# Patient Record
Sex: FEMALE | Race: BLACK OR AFRICAN AMERICAN | NOT HISPANIC OR LATINO | URBAN - METROPOLITAN AREA
[De-identification: names, ages, dates, MRNs, and addresses within clinical notes are randomized per-mention and may not be internally consistent; named-entity substitution may affect disease eponyms.]

---

## 2021-12-18 ENCOUNTER — EMERGENCY (EMERGENCY)
Facility: HOSPITAL | Age: 66
LOS: 0 days | Discharge: HOME | End: 2021-12-18
Attending: STUDENT IN AN ORGANIZED HEALTH CARE EDUCATION/TRAINING PROGRAM
Payer: MEDICARE

## 2021-12-18 VITALS
HEART RATE: 96 BPM | DIASTOLIC BLOOD PRESSURE: 80 MMHG | RESPIRATION RATE: 16 BRPM | SYSTOLIC BLOOD PRESSURE: 158 MMHG | TEMPERATURE: 98 F | OXYGEN SATURATION: 98 % | WEIGHT: 139.99 LBS | HEIGHT: 62 IN

## 2021-12-18 VITALS
SYSTOLIC BLOOD PRESSURE: 121 MMHG | OXYGEN SATURATION: 99 % | RESPIRATION RATE: 19 BRPM | DIASTOLIC BLOOD PRESSURE: 80 MMHG | HEART RATE: 80 BPM | TEMPERATURE: 97 F
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
## 2021-12-18 DIAGNOSIS — R20.0 ANESTHESIA OF SKIN: ICD-10-CM
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
## 2021-12-18 DIAGNOSIS — M54.9 DORSALGIA, UNSPECIFIED: ICD-10-CM

## 2021-12-18 DIAGNOSIS — Z88.0 ALLERGY STATUS TO PENICILLIN: ICD-10-CM

## 2021-12-18 DIAGNOSIS — R20.2 PARESTHESIA OF SKIN: ICD-10-CM

## 2021-12-18 DIAGNOSIS — Z88.6 ALLERGY STATUS TO ANALGESIC AGENT: ICD-10-CM

## 2021-12-18 LAB — GLUCOSE BLDC GLUCOMTR-MCNC: 110 MG/DL — HIGH (ref 70–99)

## 2021-12-18 PROCEDURE — 72125 CT NECK SPINE W/O DYE: CPT | Mod: 26,MA

## 2021-12-18 PROCEDURE — 99284 EMERGENCY DEPT VISIT MOD MDM: CPT

## 2021-12-18 PROCEDURE — 70450 CT HEAD/BRAIN W/O DYE: CPT | Mod: 26,MA

## 2021-12-18 RX ORDER — METHOCARBAMOL 500 MG/1
500 TABLET, FILM COATED ORAL ONCE
Refills: 0 | Status: COMPLETED | OUTPATIENT
Start: 2021-12-18 | End: 2021-12-18

## 2021-12-18 RX ORDER — OXYCODONE AND ACETAMINOPHEN 5; 325 MG/1; MG/1
1 TABLET ORAL ONCE
Refills: 0 | Status: DISCONTINUED | OUTPATIENT
Start: 2021-12-18 | End: 2021-12-18

## 2021-12-18 RX ADMIN — METHOCARBAMOL 500 MILLIGRAM(S): 500 TABLET, FILM COATED ORAL at 20:07

## 2021-12-18 RX ADMIN — OXYCODONE AND ACETAMINOPHEN 1 TABLET(S): 5; 325 TABLET ORAL at 20:07

## 2021-12-18 RX ADMIN — OXYCODONE AND ACETAMINOPHEN 1 TABLET(S): 5; 325 TABLET ORAL at 20:37

## 2021-12-18 NOTE — ED PROVIDER NOTE - OBJECTIVE STATEMENT
She is a 65 yr. old female with PMH of HTN, Asthma, CHD, Bipolar disorder, Hypothyroidism, cervical fusion BIBEMS c/o fall. She was found outside [facility]. She has chronic back pain and was seen by pain management on Thursday. She was sent to [facility] and was sent home after pain medication. Today she went to [facility] again c/o severe pain, but they were not willing to treat her with oxycodone, flexeril, Cymbalta and gabapentin together (she stated oxycodone alone will not relieve her pain).  So she walked out, but her leg gave out and fell. She reports numbness and tingling on her legs and feet, urinary retention at times, and bowel incontinence. She states she is here for pain management. Denies LOC, headache,nausea, vomiting, fever, chills, injury to head or any body parts. She is a 65 yr. old female with PMH of HTN, Asthma, CHD, Bipolar disorder, Hypothyroidism, cervical fusion, s/p cervical spine surgery (Jun 2020) BIBEMS c/o fall. She was found outside [facility]. She has chronic back pain and was seen by pain management on Thursday. She was sent to [facility] and was sent home after pain medication. Today she went to [facility] again c/o severe back pain, but they were not willing to treat her with oxycodone, flexeril, Cymbalta and gabapentin together (she stated oxycodone alone will not relieve her pain).  So she walked out, but her leg gave out and fell. She reports numbness and tingling on her legs and feet, urinary retention at times, and bowel incontinence. She states she is here for pain management. Denies LOC, headache, nausea, vomiting, fever, chills, injury to head or any body parts.

## 2021-12-18 NOTE — ED PROVIDER NOTE - NSFOLLOWUPINSTRUCTIONS_ED_ALL_ED_FT
1) Follow up with your primary care doctor  2) Return to the ER for worsening or concerning symptoms

## 2021-12-18 NOTE — ED ADULT NURSE NOTE - NSIMPLEMENTINTERV_GEN_ALL_ED
Implemented All Fall with Harm Risk Interventions:  [place] to call system. Call bell, personal items and telephone within reach. Instruct patient to call for assistance. Room bathroom lighting operational. Non-slip footwear when patient is off stretcher. Physically safe environment: no spills, clutter or unnecessary equipment. Stretcher in lowest position, wheels locked, appropriate side rails in place. Provide visual cue, wrist band, yellow gown, etc. Monitor gait and stability. Monitor for mental status changes and reorient to person, place, and time. Review medications for side effects contributing to fall risk. Reinforce activity limits and safety measures with patient and family. Provide visual clues: red socks.

## 2021-12-18 NOTE — ED ADULT TRIAGE NOTE - CHIEF COMPLAINT QUOTE
pt was eloped from [facility] and pt was by the side walk of the hospital  and someone called 911 and when ems arrived they were told by dispatcher that the hospital is on diversion and they should take the patient to another hospital . pt is c/o neck pain, back pin and pain all over the body . h/o asthma, cardiac and hypertension

## 2021-12-18 NOTE — ED ADULT NURSE NOTE - OBJECTIVE STATEMENT
Patient is alert and oriented x2. Complains of mid back pain. Pain score is 10/10. Reports she had neck surgery done on 2020. Came in with a neck brace. She said she left [facility] because they could not do anything for her. Denies any n/v, dizziness or shortness of breath.

## 2021-12-18 NOTE — ED ADULT NURSE NOTE - CHIEF COMPLAINT
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

## 2021-12-18 NOTE — ED PROVIDER NOTE - PATIENT PORTAL LINK FT
You can access the FollowMyHealth Patient Portal offered by [facility] by registering at the following website: [email or web address]. By joining [employer]’s FollowMyHealth portal, you will also be able to view your health information using other applications (apps) compatible with our system.

## 2023-05-16 ENCOUNTER — INPATIENT (INPATIENT)
Facility: HOSPITAL | Age: 68
LOS: 1 days | Discharge: LEFT BEFORE DISCHARGE | End: 2023-05-18
Attending: INTERNAL MEDICINE | Admitting: INTERNAL MEDICINE
Payer: MEDICARE

## 2023-05-16 VITALS
TEMPERATURE: 99 F | HEART RATE: 97 BPM | RESPIRATION RATE: 17 BRPM | WEIGHT: 115.08 LBS | HEIGHT: 61 IN | OXYGEN SATURATION: 97 % | SYSTOLIC BLOOD PRESSURE: 115 MMHG | DIASTOLIC BLOOD PRESSURE: 69 MMHG

## 2023-05-16 DIAGNOSIS — B34.8 OTHER VIRAL INFECTIONS OF UNSPECIFIED SITE: ICD-10-CM

## 2023-05-16 DIAGNOSIS — I10 ESSENTIAL (PRIMARY) HYPERTENSION: ICD-10-CM

## 2023-05-16 DIAGNOSIS — R09.89 OTHER SPECIFIED SYMPTOMS AND SIGNS INVOLVING THE CIRCULATORY AND RESPIRATORY SYSTEMS: ICD-10-CM

## 2023-05-16 DIAGNOSIS — F41.9 ANXIETY DISORDER, UNSPECIFIED: ICD-10-CM

## 2023-05-16 DIAGNOSIS — M54.50 LOW BACK PAIN, UNSPECIFIED: ICD-10-CM

## 2023-05-16 DIAGNOSIS — J44.1 CHRONIC OBSTRUCTIVE PULMONARY DISEASE WITH (ACUTE) EXACERBATION: ICD-10-CM

## 2023-05-16 DIAGNOSIS — E78.5 HYPERLIPIDEMIA, UNSPECIFIED: ICD-10-CM

## 2023-05-16 DIAGNOSIS — F17.200 NICOTINE DEPENDENCE, UNSPECIFIED, UNCOMPLICATED: ICD-10-CM

## 2023-05-16 DIAGNOSIS — R07.81 PLEURODYNIA: ICD-10-CM

## 2023-05-16 DIAGNOSIS — R93.89 ABNORMAL FINDINGS ON DIAGNOSTIC IMAGING OF OTHER SPECIFIED BODY STRUCTURES: ICD-10-CM

## 2023-05-16 PROBLEM — M54.9 DORSALGIA, UNSPECIFIED: Chronic | Status: ACTIVE | Noted: 2021-12-23

## 2023-05-16 LAB
ALBUMIN SERPL ELPH-MCNC: 3.2 G/DL — LOW (ref 3.3–5)
ALP SERPL-CCNC: 69 U/L — SIGNIFICANT CHANGE UP (ref 40–120)
ALT FLD-CCNC: 23 U/L — SIGNIFICANT CHANGE UP (ref 12–78)
ANION GAP SERPL CALC-SCNC: 6 MMOL/L — SIGNIFICANT CHANGE UP (ref 5–17)
APPEARANCE UR: CLEAR — SIGNIFICANT CHANGE UP
APTT BLD: 34.2 SEC — SIGNIFICANT CHANGE UP (ref 27.5–35.5)
AST SERPL-CCNC: 32 U/L — SIGNIFICANT CHANGE UP (ref 15–37)
BACTERIA # UR AUTO: ABNORMAL
BASE EXCESS BLDA CALC-SCNC: -3 MMOL/L — LOW (ref -2–3)
BASOPHILS # BLD AUTO: 0 K/UL — SIGNIFICANT CHANGE UP (ref 0–0.2)
BASOPHILS NFR BLD AUTO: 0 % — SIGNIFICANT CHANGE UP (ref 0–2)
BILIRUB SERPL-MCNC: 0.9 MG/DL — SIGNIFICANT CHANGE UP (ref 0.2–1.2)
BILIRUB UR-MCNC: NEGATIVE — SIGNIFICANT CHANGE UP
BLOOD GAS COMMENTS ARTERIAL: SIGNIFICANT CHANGE UP
BUN SERPL-MCNC: 28 MG/DL — HIGH (ref 7–23)
CALCIUM SERPL-MCNC: 9.6 MG/DL — SIGNIFICANT CHANGE UP (ref 8.5–10.1)
CHLORIDE SERPL-SCNC: 104 MMOL/L — SIGNIFICANT CHANGE UP (ref 96–108)
CO2 BLDA-SCNC: 20 MMOL/L — SIGNIFICANT CHANGE UP (ref 19–24)
CO2 SERPL-SCNC: 25 MMOL/L — SIGNIFICANT CHANGE UP (ref 22–31)
COLOR SPEC: YELLOW — SIGNIFICANT CHANGE UP
CREAT SERPL-MCNC: 1.09 MG/DL — SIGNIFICANT CHANGE UP (ref 0.5–1.3)
DIFF PNL FLD: ABNORMAL
EGFR: 56 ML/MIN/1.73M2 — LOW
EOSINOPHIL # BLD AUTO: 0 K/UL — SIGNIFICANT CHANGE UP (ref 0–0.5)
EOSINOPHIL NFR BLD AUTO: 0 % — SIGNIFICANT CHANGE UP (ref 0–6)
GAS PNL BLDA: SIGNIFICANT CHANGE UP
GLUCOSE BLDC GLUCOMTR-MCNC: 202 MG/DL — HIGH (ref 70–99)
GLUCOSE SERPL-MCNC: 116 MG/DL — HIGH (ref 70–99)
GLUCOSE UR QL: NEGATIVE MG/DL — SIGNIFICANT CHANGE UP
HCO3 BLDA-SCNC: 19 MMOL/L — LOW (ref 21–28)
HCT VFR BLD CALC: 36.5 % — SIGNIFICANT CHANGE UP (ref 34.5–45)
HGB BLD-MCNC: 12.4 G/DL — SIGNIFICANT CHANGE UP (ref 11.5–15.5)
HOROWITZ INDEX BLDA+IHG-RTO: 28 — SIGNIFICANT CHANGE UP
HPIV3 RNA SPEC QL NAA+PROBE: DETECTED
HYPOCHROMIA BLD QL: SLIGHT — SIGNIFICANT CHANGE UP
INR BLD: 1.29 RATIO — HIGH (ref 0.88–1.16)
KETONES UR-MCNC: ABNORMAL
LACTATE SERPL-SCNC: 1.7 MMOL/L — SIGNIFICANT CHANGE UP (ref 0.7–2)
LACTATE SERPL-SCNC: 2.1 MMOL/L — HIGH (ref 0.7–2)
LEUKOCYTE ESTERASE UR-ACNC: NEGATIVE — SIGNIFICANT CHANGE UP
LG PLATELETS BLD QL AUTO: SLIGHT — SIGNIFICANT CHANGE UP
LYMPHOCYTES # BLD AUTO: 0.41 K/UL — LOW (ref 1–3.3)
LYMPHOCYTES # BLD AUTO: 6 % — LOW (ref 13–44)
MAGNESIUM SERPL-MCNC: 2.2 MG/DL — SIGNIFICANT CHANGE UP (ref 1.6–2.6)
MANUAL SMEAR VERIFICATION: SIGNIFICANT CHANGE UP
MCHC RBC-ENTMCNC: 29.7 PG — SIGNIFICANT CHANGE UP (ref 27–34)
MCHC RBC-ENTMCNC: 34 G/DL — SIGNIFICANT CHANGE UP (ref 32–36)
MCV RBC AUTO: 87.5 FL — SIGNIFICANT CHANGE UP (ref 80–100)
MONOCYTES # BLD AUTO: 0.28 K/UL — SIGNIFICANT CHANGE UP (ref 0–0.9)
MONOCYTES NFR BLD AUTO: 4 % — SIGNIFICANT CHANGE UP (ref 2–14)
NEUTROPHILS # BLD AUTO: 6.22 K/UL — SIGNIFICANT CHANGE UP (ref 1.8–7.4)
NEUTROPHILS NFR BLD AUTO: 86 % — HIGH (ref 43–77)
NEUTS BAND # BLD: 4 % — SIGNIFICANT CHANGE UP (ref 0–8)
NITRITE UR-MCNC: NEGATIVE — SIGNIFICANT CHANGE UP
NRBC # BLD: 0 /100 — SIGNIFICANT CHANGE UP (ref 0–0)
NRBC # BLD: SIGNIFICANT CHANGE UP /100 WBCS (ref 0–0)
PCO2 BLDA: 27 MMHG — LOW (ref 32–46)
PH BLDA: 7.46 — HIGH (ref 7.35–7.45)
PH UR: 6 — SIGNIFICANT CHANGE UP (ref 5–8)
PLAT MORPH BLD: NORMAL — SIGNIFICANT CHANGE UP
PLATELET # BLD AUTO: 190 K/UL — SIGNIFICANT CHANGE UP (ref 150–400)
PO2 BLDA: 67 MMHG — LOW (ref 83–108)
POTASSIUM SERPL-MCNC: 3.8 MMOL/L — SIGNIFICANT CHANGE UP (ref 3.5–5.3)
POTASSIUM SERPL-SCNC: 3.8 MMOL/L — SIGNIFICANT CHANGE UP (ref 3.5–5.3)
PROT SERPL-MCNC: 7.6 GM/DL — SIGNIFICANT CHANGE UP (ref 6–8.3)
PROT UR-MCNC: 30 MG/DL
PROTHROM AB SERPL-ACNC: 15.4 SEC — HIGH (ref 10.5–13.4)
RAPID RVP RESULT: DETECTED
RBC # BLD: 4.17 M/UL — SIGNIFICANT CHANGE UP (ref 3.8–5.2)
RBC # FLD: 14.1 % — SIGNIFICANT CHANGE UP (ref 10.3–14.5)
RBC BLD AUTO: ABNORMAL
RBC CASTS # UR COMP ASSIST: ABNORMAL /HPF (ref 0–4)
SAO2 % BLDA: 96 % — SIGNIFICANT CHANGE UP (ref 94–98)
SARS-COV-2 RNA SPEC QL NAA+PROBE: SIGNIFICANT CHANGE UP
SODIUM SERPL-SCNC: 135 MMOL/L — SIGNIFICANT CHANGE UP (ref 135–145)
SP GR SPEC: 1.01 — SIGNIFICANT CHANGE UP (ref 1.01–1.02)
TOXIC GRANULES BLD QL SMEAR: PRESENT — SIGNIFICANT CHANGE UP
UROBILINOGEN FLD QL: NEGATIVE MG/DL — SIGNIFICANT CHANGE UP
WBC # BLD: 6.91 K/UL — SIGNIFICANT CHANGE UP (ref 3.8–10.5)
WBC # FLD AUTO: 6.91 K/UL — SIGNIFICANT CHANGE UP (ref 3.8–10.5)
WBC UR QL: NEGATIVE — SIGNIFICANT CHANGE UP

## 2023-05-16 PROCEDURE — 93010 ELECTROCARDIOGRAM REPORT: CPT

## 2023-05-16 PROCEDURE — 99285 EMERGENCY DEPT VISIT HI MDM: CPT

## 2023-05-16 PROCEDURE — 99283 EMERGENCY DEPT VISIT LOW MDM: CPT

## 2023-05-16 PROCEDURE — 71045 X-RAY EXAM CHEST 1 VIEW: CPT | Mod: 26

## 2023-05-16 PROCEDURE — 99222 1ST HOSP IP/OBS MODERATE 55: CPT

## 2023-05-16 PROCEDURE — 71275 CT ANGIOGRAPHY CHEST: CPT | Mod: 26

## 2023-05-16 RX ORDER — IPRATROPIUM/ALBUTEROL SULFATE 18-103MCG
3 AEROSOL WITH ADAPTER (GRAM) INHALATION ONCE
Refills: 0 | Status: COMPLETED | OUTPATIENT
Start: 2023-05-16 | End: 2023-05-16

## 2023-05-16 RX ORDER — LOSARTAN POTASSIUM 100 MG/1
25 TABLET, FILM COATED ORAL DAILY
Refills: 0 | Status: DISCONTINUED | OUTPATIENT
Start: 2023-05-16 | End: 2023-05-18

## 2023-05-16 RX ORDER — MONTELUKAST 4 MG/1
10 TABLET, CHEWABLE ORAL DAILY
Refills: 0 | Status: DISCONTINUED | OUTPATIENT
Start: 2023-05-16 | End: 2023-05-18

## 2023-05-16 RX ORDER — SODIUM CHLORIDE 9 MG/ML
2000 INJECTION, SOLUTION INTRAVENOUS ONCE
Refills: 0 | Status: COMPLETED | OUTPATIENT
Start: 2023-05-16 | End: 2023-05-16

## 2023-05-16 RX ORDER — ROSUVASTATIN CALCIUM 5 MG/1
1 TABLET ORAL
Refills: 0 | DISCHARGE

## 2023-05-16 RX ORDER — ACETAMINOPHEN 500 MG
1000 TABLET ORAL ONCE
Refills: 0 | Status: COMPLETED | OUTPATIENT
Start: 2023-05-16 | End: 2023-05-16

## 2023-05-16 RX ORDER — METOPROLOL TARTRATE 50 MG
0.5 TABLET ORAL
Refills: 0 | DISCHARGE

## 2023-05-16 RX ORDER — RISPERIDONE 4 MG/1
2 TABLET ORAL DAILY
Refills: 0 | Status: DISCONTINUED | OUTPATIENT
Start: 2023-05-16 | End: 2023-05-18

## 2023-05-16 RX ORDER — OXYCODONE AND ACETAMINOPHEN 5; 325 MG/1; MG/1
1 TABLET ORAL
Refills: 0 | DISCHARGE

## 2023-05-16 RX ORDER — ACETAMINOPHEN 500 MG
650 TABLET ORAL EVERY 6 HOURS
Refills: 0 | Status: DISCONTINUED | OUTPATIENT
Start: 2023-05-16 | End: 2023-05-18

## 2023-05-16 RX ORDER — LEVOTHYROXINE SODIUM 125 MCG
25 TABLET ORAL DAILY
Refills: 0 | Status: DISCONTINUED | OUTPATIENT
Start: 2023-05-17 | End: 2023-05-18

## 2023-05-16 RX ORDER — ALPRAZOLAM 0.25 MG
1 TABLET ORAL
Refills: 0 | Status: DISCONTINUED | OUTPATIENT
Start: 2023-05-16 | End: 2023-05-18

## 2023-05-16 RX ORDER — SODIUM CHLORIDE 9 MG/ML
1000 INJECTION, SOLUTION INTRAVENOUS ONCE
Refills: 0 | Status: COMPLETED | OUTPATIENT
Start: 2023-05-16 | End: 2023-05-16

## 2023-05-16 RX ORDER — OXYCODONE HYDROCHLORIDE 5 MG/1
10 TABLET ORAL EVERY 8 HOURS
Refills: 0 | Status: DISCONTINUED | OUTPATIENT
Start: 2023-05-16 | End: 2023-05-18

## 2023-05-16 RX ORDER — ATORVASTATIN CALCIUM 80 MG/1
80 TABLET, FILM COATED ORAL AT BEDTIME
Refills: 0 | Status: DISCONTINUED | OUTPATIENT
Start: 2023-05-16 | End: 2023-05-18

## 2023-05-16 RX ORDER — ALPRAZOLAM 0.25 MG
0.25 TABLET ORAL ONCE
Refills: 0 | Status: DISCONTINUED | OUTPATIENT
Start: 2023-05-16 | End: 2023-05-16

## 2023-05-16 RX ORDER — SODIUM CHLORIDE 9 MG/ML
1000 INJECTION INTRAMUSCULAR; INTRAVENOUS; SUBCUTANEOUS ONCE
Refills: 0 | Status: DISCONTINUED | OUTPATIENT
Start: 2023-05-16 | End: 2023-05-16

## 2023-05-16 RX ORDER — RISPERIDONE 4 MG/1
1 TABLET ORAL
Refills: 0 | DISCHARGE

## 2023-05-16 RX ORDER — NICOTINE POLACRILEX 2 MG
1 GUM BUCCAL DAILY
Refills: 0 | Status: DISCONTINUED | OUTPATIENT
Start: 2023-05-16 | End: 2023-05-18

## 2023-05-16 RX ORDER — METOPROLOL TARTRATE 50 MG
25 TABLET ORAL DAILY
Refills: 0 | Status: DISCONTINUED | OUTPATIENT
Start: 2023-05-16 | End: 2023-05-18

## 2023-05-16 RX ORDER — SENNA PLUS 8.6 MG/1
2 TABLET ORAL AT BEDTIME
Refills: 0 | Status: DISCONTINUED | OUTPATIENT
Start: 2023-05-16 | End: 2023-05-18

## 2023-05-16 RX ORDER — ENOXAPARIN SODIUM 100 MG/ML
40 INJECTION SUBCUTANEOUS EVERY 24 HOURS
Refills: 0 | Status: DISCONTINUED | OUTPATIENT
Start: 2023-05-16 | End: 2023-05-18

## 2023-05-16 RX ORDER — LORATADINE 10 MG/1
1 TABLET ORAL
Refills: 0 | DISCHARGE

## 2023-05-16 RX ORDER — LORATADINE 10 MG/1
10 TABLET ORAL DAILY
Refills: 0 | Status: DISCONTINUED | OUTPATIENT
Start: 2023-05-16 | End: 2023-05-18

## 2023-05-16 RX ORDER — CHOLECALCIFEROL (VITAMIN D3) 125 MCG
1 CAPSULE ORAL
Refills: 0 | DISCHARGE

## 2023-05-16 RX ORDER — IPRATROPIUM/ALBUTEROL SULFATE 18-103MCG
3 AEROSOL WITH ADAPTER (GRAM) INHALATION EVERY 6 HOURS
Refills: 0 | Status: DISCONTINUED | OUTPATIENT
Start: 2023-05-16 | End: 2023-05-18

## 2023-05-16 RX ORDER — IPRATROPIUM/ALBUTEROL SULFATE 18-103MCG
3 AEROSOL WITH ADAPTER (GRAM) INHALATION EVERY 4 HOURS
Refills: 0 | Status: DISCONTINUED | OUTPATIENT
Start: 2023-05-16 | End: 2023-05-16

## 2023-05-16 RX ORDER — CYCLOBENZAPRINE HYDROCHLORIDE 10 MG/1
5 TABLET, FILM COATED ORAL THREE TIMES A DAY
Refills: 0 | Status: DISCONTINUED | OUTPATIENT
Start: 2023-05-16 | End: 2023-05-18

## 2023-05-16 RX ORDER — LOSARTAN POTASSIUM 100 MG/1
1 TABLET, FILM COATED ORAL
Refills: 0 | DISCHARGE

## 2023-05-16 RX ORDER — BUDESONIDE AND FORMOTEROL FUMARATE DIHYDRATE 160; 4.5 UG/1; UG/1
2 AEROSOL RESPIRATORY (INHALATION)
Refills: 0 | Status: DISCONTINUED | OUTPATIENT
Start: 2023-05-16 | End: 2023-05-18

## 2023-05-16 RX ORDER — LANOLIN ALCOHOL/MO/W.PET/CERES
3 CREAM (GRAM) TOPICAL AT BEDTIME
Refills: 0 | Status: DISCONTINUED | OUTPATIENT
Start: 2023-05-16 | End: 2023-05-18

## 2023-05-16 RX ORDER — ASPIRIN/CALCIUM CARB/MAGNESIUM 324 MG
81 TABLET ORAL DAILY
Refills: 0 | Status: DISCONTINUED | OUTPATIENT
Start: 2023-05-16 | End: 2023-05-18

## 2023-05-16 RX ORDER — MAGNESIUM SULFATE 500 MG/ML
2 VIAL (ML) INJECTION ONCE
Refills: 0 | Status: COMPLETED | OUTPATIENT
Start: 2023-05-16 | End: 2023-05-16

## 2023-05-16 RX ORDER — MONTELUKAST 4 MG/1
1 TABLET, CHEWABLE ORAL
Refills: 0 | DISCHARGE

## 2023-05-16 RX ADMIN — Medication 1000 MILLIGRAM(S): at 12:22

## 2023-05-16 RX ADMIN — Medication 3 MILLILITER(S): at 23:13

## 2023-05-16 RX ADMIN — SODIUM CHLORIDE 1000 MILLILITER(S): 9 INJECTION, SOLUTION INTRAVENOUS at 14:22

## 2023-05-16 RX ADMIN — ENOXAPARIN SODIUM 40 MILLIGRAM(S): 100 INJECTION SUBCUTANEOUS at 18:09

## 2023-05-16 RX ADMIN — SODIUM CHLORIDE 2000 MILLILITER(S): 9 INJECTION, SOLUTION INTRAVENOUS at 12:06

## 2023-05-16 RX ADMIN — ATORVASTATIN CALCIUM 80 MILLIGRAM(S): 80 TABLET, FILM COATED ORAL at 21:08

## 2023-05-16 RX ADMIN — CYCLOBENZAPRINE HYDROCHLORIDE 5 MILLIGRAM(S): 10 TABLET, FILM COATED ORAL at 19:00

## 2023-05-16 RX ADMIN — Medication 3 MILLILITER(S): at 17:19

## 2023-05-16 RX ADMIN — Medication 3 MILLILITER(S): at 11:56

## 2023-05-16 RX ADMIN — Medication 100 MILLIGRAM(S): at 15:42

## 2023-05-16 RX ADMIN — SODIUM CHLORIDE 1000 MILLILITER(S): 9 INJECTION INTRAMUSCULAR; INTRAVENOUS; SUBCUTANEOUS at 11:45

## 2023-05-16 RX ADMIN — SODIUM CHLORIDE 2000 MILLILITER(S): 9 INJECTION, SOLUTION INTRAVENOUS at 13:06

## 2023-05-16 RX ADMIN — Medication 20 MILLIGRAM(S): at 21:08

## 2023-05-16 RX ADMIN — OXYCODONE HYDROCHLORIDE 10 MILLIGRAM(S): 5 TABLET ORAL at 18:59

## 2023-05-16 RX ADMIN — BUDESONIDE AND FORMOTEROL FUMARATE DIHYDRATE 2 PUFF(S): 160; 4.5 AEROSOL RESPIRATORY (INHALATION) at 18:09

## 2023-05-16 RX ADMIN — SENNA PLUS 2 TABLET(S): 8.6 TABLET ORAL at 21:18

## 2023-05-16 RX ADMIN — Medication 1000 MILLIGRAM(S): at 13:00

## 2023-05-16 RX ADMIN — Medication 400 MILLIGRAM(S): at 12:07

## 2023-05-16 RX ADMIN — Medication 0.25 MILLIGRAM(S): at 15:43

## 2023-05-16 RX ADMIN — Medication 1 PATCH: at 20:54

## 2023-05-16 RX ADMIN — Medication 150 GRAM(S): at 11:45

## 2023-05-16 RX ADMIN — Medication 650 MILLIGRAM(S): at 23:06

## 2023-05-16 RX ADMIN — SODIUM CHLORIDE 1000 MILLILITER(S): 9 INJECTION INTRAMUSCULAR; INTRAVENOUS; SUBCUTANEOUS at 11:38

## 2023-05-16 RX ADMIN — Medication 2 GRAM(S): at 12:05

## 2023-05-16 RX ADMIN — Medication 1 MILLIGRAM(S): at 20:52

## 2023-05-16 RX ADMIN — Medication 600 MILLIGRAM(S): at 18:09

## 2023-05-16 RX ADMIN — Medication 3 MILLILITER(S): at 11:49

## 2023-05-16 NOTE — ED ADULT NURSE NOTE - OBJECTIVE STATEMENT
66yo female, A&Ox4, BIBA for difficulty breathing and cough for weeks, symptoms have become increasing worse in the last 3 days. EMS gave 3 combi and was given 12mg of decadron IM. Wheezing noted on auscultation. hx COPD, DM, HTN, Bipolar

## 2023-05-16 NOTE — ED PROVIDER NOTE - CONSTITUTIONAL, MLM
normal... Well appearing, awake, alert, oriented to person, place, time/situation and in no apparent distress. Speaking in clear full sentences no nasal flaring no shoulders retractions no diaphoresis, appears comfortable lying in the stretcher in a bright light room

## 2023-05-16 NOTE — H&P ADULT - PROBLEM SELECTOR PLAN 1
SOB, cough and wheezing  Tachycardic, tachypneic, still very SOB with wheezing  Received Dexamethasone 12mg IM, duoneb x 3 with EMS  likely in the setting of parainfluenza infection  continue IV solumedrol 20mg q8hr  Duoneb q4hr, if improve, will change to q6hr  continue Symbicort and singular  closely monitor respiratory status given h/o multiple intubation before  pleuritic chest pain--> CTA with PE protocol  pulmonary consulted

## 2023-05-16 NOTE — H&P ADULT - PROBLEM SELECTOR PLAN 4
CXR image reviewed  ( I personally review) , bilateral Lower lung infiltrates noted. ? mass like opacity  Getting CTA chest, follow up result to see if there is any lung malignancy/mass

## 2023-05-16 NOTE — CHART NOTE - NSCHARTNOTEFT_GEN_A_CORE
Chip Tillman | Reference #: [id number]    Practitioner Count: 3  Pharmacy Count: 1  Current Opioid Prescriptions: 1  Current Benzodiazepine Prescriptions: 1  Current Stimulant Prescriptions: 0      Patient Demographic Information (PDI)       PDI	First Name	Last Name	Birth Date	Gender	Street Address	City	State	Zip Code  A	Elisa	Louallen	1955	Female	[address] [facility]	[address]  B	Elisa	Louallen	1955	Female	[address] 	[facility]	[address]  C	Elisa	Louallen	1955	Female	[address] [facility]	[address]  D	Elisa	Louallen	1955	Female	[address] [facility]	[address]  E	Elisa	Louallen	1955	Female	[address] [facility]	[address]    Prescription Information      PDI Filter:    PDI	My Rx	Current Rx	Drug Type	Rx Written	Rx Dispensed	Drug	Quantity	Days Supply	Prescriber Name	Prescriber FLYNN #	Payment Method	Dispenser  A	N	Y	O	04/21/2023	04/21/2023	oxycodone-acetaminophen  mg tab	90	30	Eleanor Oconnor MD	[id number]	[facility]  B	N	N	O	06/07/2022	06/15/2022	oxycodone-acetaminophen  mg tab	90	30	Pramod Medina	[id number]	Medicare	[facility] [id number]  C	N	N	O	07/14/2022	07/15/2022	acetaminophen-cod #3 tablet	12	3	Eleanor Oconnor MD	[id number]	Insurance	[facility]  D	N	N	O	01/18/2023	01/21/2023	oxycodone-acetaminophen  mg tab	90	30	Eleanor Oconnor MD	[id number]	Insurance	[facility]  D	N	N	O	12/16/2022	12/19/2022	oxycodone-acetaminophen  mg tab	90	30	Eleanor Oconnor MD	[id number]	Insurance	[facility]  D	N	N	O	11/16/2022	11/21/2022	oxycodone-acetaminophen  mg tab	90	30	Eleanor Oconnor MD	[id number]	Insurance	[facility]  D	N	N	O	10/20/2022	10/21/2022	oxycodone-acetaminophen  mg tab	90	30	Elvin, Eleanor DAY MD	[id number]	Insurance	[facility]  D	N	N	O	09/16/2022	09/22/2022	oxycodone-acetaminophen  mg tab	90	30	Elvin, Eleanor DAY MD	[id number]	Insurance	[facility]  D	N	N	O	08/19/2022	08/22/2022	oxycodone-acetaminophen  mg tab	90	30	Elvin, Eleanor DAY MD	[id number]	Insurance	[facility]  D	N	N	O	07/19/2022	07/22/2022	oxycodone-acetaminophen  mg tab	90	30	Elvin, Eleanor DAY MD	[id number]	Insurance	[facility]  E	N	Y	B	04/17/2023	04/17/2023	alprazolam 1 mg tablet	60	30	John Saldana	[id number]	Insurance	[facility]  E	N	N	O	03/16/2023	03/21/2023	oxycodone-acetaminophen  mg tab	90	30	Elvin, Eleanor DAY MD	[id number]	Insurance	[facility]  E	N	N	B	03/17/2023	03/21/2023	alprazolam 0.5 mg tablet	60	30	Deb Hilario [id number]	Insurance	[facility]  E	N	N	O	02/17/2023	02/20/2023	oxycodone-acetaminophen  mg tab	90	30	Elvin, Eleanor DAY MD	[id number]	Insurance	[facility]  E	N	N	B	02/17/2023	02/20/2023	alprazolam 0.25 mg tablet	60	30	Deb Hilario [id number]	Insurance	[facility]    * - Details of Drug Type : O = Opioid, B = Benzodiazepine, S = Stimulant

## 2023-05-16 NOTE — ED ADULT NURSE NOTE - ED STAT RN HANDOFF DETAILS
Report endorsed to HOLD JESSICA holbrook. Safety checks compld this shift/Safety rounds completed hourly.  IV sites checked Q2+remains WDL. Meds given as ord with no s/s of adverse RXNs. Fall +skin precs in place. Pt resting comfortably, no distress noted, resp even and unlabored, pt updated about the plan of care. v/s stable and okay to transport to . Called hospitalist, Dr. west as per pt. states " I haven't pee for 2 days and I can't pee now". Dr. west made aware and will put in order for straight cath at this time. ED Hold yusef made aware. Report endorsed to HOLD JESSICA holbrook. Safety checks compld this shift/Safety rounds completed hourly.  IV sites checked Q2+remains WDL. Meds given as ord with no s/s of adverse RXNs. Fall +skin precs in place. Pt resting comfortably, no distress noted, resp even and unlabored, pt updated about the plan of care. repeat lactate sent. v/s stable and okay to transport to . Called hospitalist, Dr. west as per pt. states " I haven't pee for 2 days and I can't pee now". Dr. west made aware and will put in order for straight cath at this time. ED Hold yusef made aware.

## 2023-05-16 NOTE — PATIENT PROFILE ADULT - MONEY FOR FOOD - DETAILS
Patient states daughter uses her food stamp money and that patient does not see the food that is bought

## 2023-05-16 NOTE — ED PROVIDER NOTE - OBJECTIVE STATEMENT
67 years old female here by ems c/o cough, sob x 3 days. Pt received duo nebs x 3 decodran 12 mg im pt sts she has a hx of emphysema and she still smokes daily. Pt sts she has had being et intubated many times in the past Last hospitalization was [place] of last year. Pt also c/o chills. Pt denies headache, dizziness, blurred visions, light sensitivities, focal/distal weakness or numbness, neck/back/hips/calfs pain, chest pain, nausea, vomiting, abd pain, dysuria, or irregular bowel movements.

## 2023-05-16 NOTE — H&P ADULT - NSHPPHYSICALEXAM_GEN_ALL_CORE
CONSTITUTIONAL: alert and cooperative, moderate respiratory distress, multiple coughing spells  EYES: PERRL,  no scleral icterus  ENT: Mucosa moist, tongue normal  NECK: Neck supple, trachea midline, non-tender  CARDIAC: Normal S1 and S2. Regular rate and rhythms. No Pedal edema. Peripheral pulses intact  LUNGS: Equal air entry both lungs. Bilateral expiratory wheezing. Increase respiratory effort.   ABDOMEN: Soft, nondistended, nontender. No guarding or rebound tenderness. No hepatomegaly or splenomegaly. Bowel sound normal.   MUSCULOSKELETAL: Normocephalic, atraumatic. No significant deformity or joint abnormality  NEUROLOGICAL: No gross motor or sensory deficits  SKIN: no lesions or eruptions. Normal turgor  PSYCHIATRIC: A&O x 3, appear anxious

## 2023-05-16 NOTE — ED ADULT TRIAGE NOTE - CHIEF COMPLAINT QUOTE
Patient BIBA for difficulty breathing x 3 days and that become increasing worse. EMS gave 3 combi and was given 12mg of decadron IM.  hx COPD, DM, HTN,.

## 2023-05-16 NOTE — PATIENT PROFILE ADULT - FALL HARM RISK - HARM RISK INTERVENTIONS

## 2023-05-16 NOTE — ED PROVIDER NOTE - NSICDXPASTMEDICALHX_GEN_ALL_CORE_FT
PAST MEDICAL HISTORY:  Chronic back pain     COPD, severity to be determined     Diabetes mellitus     HLD (hyperlipidemia)     Hypertension

## 2023-05-16 NOTE — ED ADULT NURSE NOTE - NSFALLRISKINTERV_ED_ALL_ED

## 2023-05-16 NOTE — CONSULT NOTE ADULT - SUBJECTIVE AND OBJECTIVE BOX
CHIEF COMPLAINT/ REASON FOR VISIT  .. Patient was seen to address the  issue listed under PROBLEM LIST which is located toward bottom of this note     MINHELENA DANIEL    LVS ERHO TELE 02    Allergies    morphine (Rash)  penicillin (Anaphylaxis)    Intolerances        PAST MEDICAL & SURGICAL HISTORY:  Chronic back pain      Diabetes mellitus      Hypertension      HLD (hyperlipidemia)      COPD, severity to be determined          FAMILY HISTORY:      Home Medications:  ALPRAZolam 1 mg oral tablet: 1 tab(s) orally 2 times a day (16 May 2023 15:00)  aspirin 81 mg oral tablet: 1 tab(s) orally once a day (16 May 2023 15:01)  cyclobenzaprine 10 mg oral tablet: 1 tab(s) orally 2 times a day (16 May 2023 15:00)  hydroCHLOROthiazide 12.5 mg oral tablet: 1 tab(s) orally once a day (16 May 2023 15:01)  levothyroxine 25 mcg (0.025 mg) oral tablet: 0.5 tab(s) orally once a day (16 May 2023 15:00)  loratadine 10 mg oral tablet: 1 tab(s) orally once a day (16 May 2023 15:00)  losartan 25 mg oral tablet: 1 tab(s) orally once a day (16 May 2023 15:00)  metoprolol succinate 25 mg oral tablet, extended release: 0.5 tab(s) orally once a day (16 May 2023 15:00)  montelukast 10 mg oral tablet: 1 tab(s) orally once a day (16 May 2023 15:00)  oxycodone-acetaminophen 10 mg-325 mg oral tablet: 1 tab(s) orally every 8 hours as needed for  severe pain (16 May 2023 15:00)  risperiDONE 2 mg oral tablet, disintegratin tab(s) orally once a day (16 May 2023 15:01)  rosuvastatin 20 mg oral tablet: 1 tab(s) orally once a day (16 May 2023 15:01)  Vitamin D3 125 mcg (5000 intl units) oral capsule: 1 tab(s) orally once a day (16 May 2023 15:01)      MEDICATIONS  (STANDING):  albuterol/ipratropium for Nebulization 3 milliLiter(s) Nebulizer every 4 hours  aspirin enteric coated 81 milliGRAM(s) Oral daily  atorvastatin 80 milliGRAM(s) Oral at bedtime  budesonide 160 MICROgram(s)/formoterol 4.5 MICROgram(s) Inhaler 2 Puff(s) Inhalation two times a day  enoxaparin Injectable 40 milliGRAM(s) SubCutaneous every 24 hours  guaiFENesin  milliGRAM(s) Oral every 12 hours  loratadine 10 milliGRAM(s) Oral daily  losartan 25 milliGRAM(s) Oral daily  methylPREDNISolone sodium succinate Injectable 20 milliGRAM(s) IV Push every 8 hours  metoprolol succinate ER 25 milliGRAM(s) Oral daily  montelukast 10 milliGRAM(s) Oral daily  risperiDONE  Disintegrating Tablet 2 milliGRAM(s) Oral daily  senna 2 Tablet(s) Oral at bedtime    MEDICATIONS  (PRN):  acetaminophen     Tablet .. 650 milliGRAM(s) Oral every 6 hours PRN Mild Pain (1 - 3), Moderate Pain (4 - 6)  ALPRAZolam 1 milliGRAM(s) Oral two times a day PRN anxiety  benzonatate 100 milliGRAM(s) Oral three times a day PRN Cough  cyclobenzaprine 5 milliGRAM(s) Oral three times a day PRN Muscle Spasm  melatonin 3 milliGRAM(s) Oral at bedtime PRN Insomnia  oxyCODONE    IR 10 milliGRAM(s) Oral every 8 hours PRN Severe Pain (7 - 10)      Diet, DASH/TLC:   Sodium & Cholesterol Restricted (23 @ 15:00) [Active]          Vital Signs Last 24 Hrs  T(C): 36.4 (16 May 2023 17:31), Max: 39.2 (16 May 2023 11:20)  T(F): 97.6 (16 May 2023 17:31), Max: 102.6 (16 May 2023 11:20)  HR: 96 (16 May 2023 17:31) (72 - 141)  BP: 111/66 (16 May 2023 17:31) (95/30 - 121/55)  BP(mean): 76 (16 May 2023 16:20) (74 - 76)  RR: 14 (16 May 2023 17:31) (14 - 31)  SpO2: 96% (16 May 2023 17:31) (92% - 97%)    Parameters below as of 16 May 2023 17:31  Patient On (Oxygen Delivery Method): nasal cannula  O2 Flow (L/min): 2                LABS:                        12.4   6.91  )-----------( 190      ( 16 May 2023 11:20 )             36.5         135  |  104  |  28<H>  ----------------------------<  116<H>  3.8   |  25  |  1.09    Ca    9.6      16 May 2023 11:20  Mg     2.2         TPro  7.6  /  Alb  3.2<L>  /  TBili  0.9  /  DBili  x   /  AST  32  /  ALT  23  /  AlkPhos  69      PT/INR - ( 16 May 2023 11:20 )   PT: 15.4 sec;   INR: 1.29 ratio         PTT - ( 16 May 2023 11:20 )  PTT:34.2 sec      ABG - ( 16 May 2023 11:59 )  pH, Arterial: 7.46  pH, Blood: x     /  pCO2: 27    /  pO2: 67    / HCO3: 19    / Base Excess: -3.0  /  SaO2: 96.0                WBC:  WBC Count: 6.91 K/uL ( @ 11:20)      MICROBIOLOGY:  RECENT CULTURES:              PT/INR - ( 16 May 2023 11:20 )   PT: 15.4 sec;   INR: 1.29 ratio         PTT - ( 16 May 2023 11:20 )  PTT:34.2 sec    Sodium:  Sodium, Serum: 135 mmol/L ( @ 11:20)      1.09 mg/dL  @ 11:20      Hemoglobin:  Hemoglobin: 12.4 g/dL ( @ 11:20)      Platelets: Platelet Count - Automated: 190 K/uL ( @ 11:20)      LIVER FUNCTIONS - ( 16 May 2023 11:20 )  Alb: 3.2 g/dL / Pro: 7.6 gm/dL / ALK PHOS: 69 U/L / ALT: 23 U/L / AST: 32 U/L / GGT: x                 RADIOLOGY & ADDITIONAL STUDIES:      MICROBIOLOGY:  RECENT CULTURES:

## 2023-05-16 NOTE — H&P ADULT - HISTORY OF PRESENT ILLNESS
67 years old female with h/o COPD, HTN, HLD, anxiety/depression, active smoker, low back pain, s/p spine surgeries present to ED with complain of SOB. Patient reported SOB for last 1-2 weeks, which significant worsened over last 3 days, associated with wheezing, productive cough. Patient also reported pleuritic chest pain with coughing. Reported feverish feeling.   Tachycardic, tachypneic, sat well with 2L NC. EKG with sinus tachy. No leukocytosis, plt 190, K 3.8, Cr 1.09, glucose 116, lactate 2.1, ABG with pH 7.46, pCO2 27. RVP positive for parainfluenza virus. CXR image reviewed, bilateral Lower lung infiltrates noted. ? mass like opacity    SH: active smoker since age of 14 years old

## 2023-05-16 NOTE — H&P ADULT - PROBLEM SELECTOR PLAN 8
on narcotic pain med at home, istop checked  continue with oxy IR 10mg q8hr prn for severe pain   Flexeril prn

## 2023-05-16 NOTE — H&P ADULT - ASSESSMENT
67 years old female with h/o COPD, HTN, HLD, anxiety/depression, active smoker, low back pain, s/p spine surgeries present to ED with complain of SOB. Patient reported SOB for last 1-2 weeks, which significant worsened over last 3 days, associated with wheezing, productive cough. Patient also reported pleuritic chest pain with coughing. Reported feverish feeling.   Tachycardic, tachypneic, sat well with 2L NC. EKG with sinus tachy. No leukocytosis, plt 190, K 3.8, Cr 1.09, glucose 116, lactate 2.1, ABG with pH 7.46, pCO2 27. RVP positive for parainfluenza virus. CXR image reviewed, bilateral Lower lung infiltrates noted. ? mass like opacity    Admitted with COPD exacerbation

## 2023-05-16 NOTE — ED PROVIDER NOTE - PROGRESS NOTE DETAILS
bed side bp 104/69 hr 100 rr 13 pox 96 on 2 liter O2. Pt is talking on her cellphone. Dr. Tillman is notified and admit pt Pt has normal wbc the fever most likely due to virus cxr possible mass and Dr. Tillman sts he is going to ordered ct chest with iv contrast and no antibiotic iv now.

## 2023-05-16 NOTE — CONSULT NOTE ADULT - ASSESSMENT
Initial evaluation/Pulmonary Critical Care consultation requested on 5/16/2023  by Dr CHOI    from Dr Alonso   Patient examined chart reviewed    HOSPITAL ADMISSION   PATIENT CAME  FROM (if information available)      BEST PRACTICE ISSUES.    HOB ELEVATN.   .. Yes  DVT PPLX.   ..   5/16/2023 lvnx 40    STRESS ULCER PPLX   ..      INFN PPLX.   ..    SP SW MIRZA.    ..        DIET.    ..  5/16/2023 DASH   IV fl.  ..      PROCEDURES.  ..   PAST PROCEDURES.    ..     GENERAL DATA .   GOC.    .. 5/16/2023 full code      ALLGY.  ..     morphine pncl                     WT.  ..  5/16/2023 52  BMI.  ..  5/16/2023 21                 ICU STAY.   .. none  COVID.   ..  scv2 5/16/2023 (-)     ABGS.  5/16/2023 .28 746/27/67     VS/ PO/IO/ VENT/ DRIPS.   5/16/2023 afeb 96 110/60   5/16/2023 2l 96%         PROBLEM/ASSESSMENT/PLAN.  PULM.  . TYPE 1 RESP FAILURE   .. 5/16/2023 .28 746/27/67   .. target po 90-95%  . RO PE.  .. cta ch 5/16/2023 cta ch   .... no pe   .... emphysema  .... chronic interstitial ch  .... superimposed ground glass opacity   .. PE ruled out  . COPD ex.  .. 5/16/2023 duoneb.6  .. 5/16/2023 symbicort 160   .. 5/16/2023 solumed 20.3    . 5/16/2023 MONTELUKAST 10   . COUGH.  .. 5/16/2023 benzonatate 100.3  .. 5/16/2023 guaifenesin 600.2   .. 5/16/2023 loratadine 10      INFECTION.  .. W 5/16/2023 w 6.9  .. cta ch 5/16/2023 cta ch   .... chronic interstitial ch  .... superimposed ground glass opacity   .. RVP 5/16/2023 Parainfluenza 3     CARDIAC.  . lacticemia.  .. la 5/16-5/16/2023 la 2.1 -1.7   . CAD   .. 5/16/2023 ASA 81   .. 5/16/2023 metoprolol 25   .. 5/16/2023 atorvastat 80   .. continue    HEMAT.  .. Hb 5/16/2023 Hb 12.4   .. monitor       RENAL.  .. Na 5/16/2023 Na 135   .. CO2 5/16/2023 CO2 25  .. Cr 5/16/2023 Cr 1     ENDOCRINE.  .. Hypothyroid.  .. 5/16/2023 levoxyl 25    NEURO.  . ANXIETY   .. 5/16/2023 xanax 1.2p  . BACK PAIN.  .. 5/16/2023 Cyclobenzprine 5.3        OVERALL.  . 67 years old female with h/o COPD, HTN, HLD, anxiety/depression, active smoker, low back pain, s/p spine surgeries present to ED with complain of SOB.   . Patient reported SOB for last 1-2 weeks, which significant worsened over last 3 days, associated with wheezing, productive cough. Patient also reported pleuritic chest pain with coughing. Reported feverish feeling.   . In ER 5/16/2023Tachycardic, tachypneic, sat well with 2L NC. EKG with sinus tachy. No leukocytosis, plt 190, K 3.8, Cr 1.09, glucose 116, lactate 2.1, ABG with pH 7.46, pCO2 27. RVP positive for parainfluenza virus. CXR image reviewed, bilateral Lower lung infiltrates noted.   SH: active smoker since age of 14 years old  . Pulm consulted 5/16/2023     PROBLEMS .   . RESP FAILURE   . SMOKER   . COPD EX   . RO VTE     PMH.   . COPD,   . active smoker  . HTN, HLD,   . anxiety/depression,   . low back pain, s/p spine surgeries    CURRENT ISSUES.  .  COPD ex on rx  . Type 1 Resp failure On O2   .      TIME SPENT   Over 55 minutes aggregate care time spent on encounter; activities included   direct patient care, counseling and/or coordinating care reviewing notes, lab data/ imaging , discussion with multidisciplinary team/ patient  /family and explaining in detail risks, benefits, alternatives  of the recommendations     MARÍA WILKINSON 67 f 5/16/2023 1955 DR MARISELA CHOI

## 2023-05-17 DIAGNOSIS — J96.01 ACUTE RESPIRATORY FAILURE WITH HYPOXIA: ICD-10-CM

## 2023-05-17 DIAGNOSIS — F43.22 ADJUSTMENT DISORDER WITH ANXIETY: ICD-10-CM

## 2023-05-17 LAB
A1C WITH ESTIMATED AVERAGE GLUCOSE RESULT: 6 % — HIGH (ref 4–5.6)
ALBUMIN SERPL ELPH-MCNC: 2.7 G/DL — LOW (ref 3.3–5)
ALP SERPL-CCNC: 62 U/L — SIGNIFICANT CHANGE UP (ref 40–120)
ALT FLD-CCNC: 23 U/L — SIGNIFICANT CHANGE UP (ref 12–78)
ANION GAP SERPL CALC-SCNC: 4 MMOL/L — LOW (ref 5–17)
AST SERPL-CCNC: 34 U/L — SIGNIFICANT CHANGE UP (ref 15–37)
BILIRUB SERPL-MCNC: 0.4 MG/DL — SIGNIFICANT CHANGE UP (ref 0.2–1.2)
BUN SERPL-MCNC: 28 MG/DL — HIGH (ref 7–23)
CALCIUM SERPL-MCNC: 8.7 MG/DL — SIGNIFICANT CHANGE UP (ref 8.5–10.1)
CHLORIDE SERPL-SCNC: 104 MMOL/L — SIGNIFICANT CHANGE UP (ref 96–108)
CHOLEST SERPL-MCNC: 143 MG/DL — SIGNIFICANT CHANGE UP
CO2 SERPL-SCNC: 23 MMOL/L — SIGNIFICANT CHANGE UP (ref 22–31)
CREAT SERPL-MCNC: 0.97 MG/DL — SIGNIFICANT CHANGE UP (ref 0.5–1.3)
EGFR: 64 ML/MIN/1.73M2 — SIGNIFICANT CHANGE UP
ESTIMATED AVERAGE GLUCOSE: 126 MG/DL — HIGH (ref 68–114)
GLUCOSE BLDC GLUCOMTR-MCNC: 150 MG/DL — HIGH (ref 70–99)
GLUCOSE BLDC GLUCOMTR-MCNC: 207 MG/DL — HIGH (ref 70–99)
GLUCOSE SERPL-MCNC: 133 MG/DL — HIGH (ref 70–99)
HCT VFR BLD CALC: 31.5 % — LOW (ref 34.5–45)
HCV AB S/CO SERPL IA: 8.43 S/CO — HIGH (ref 0–0.99)
HCV AB SERPL-IMP: REACTIVE
HDLC SERPL-MCNC: 45 MG/DL — LOW
HGB BLD-MCNC: 10.7 G/DL — LOW (ref 11.5–15.5)
LIPID PNL WITH DIRECT LDL SERPL: 75 MG/DL — SIGNIFICANT CHANGE UP
MAGNESIUM SERPL-MCNC: 2.9 MG/DL — HIGH (ref 1.6–2.6)
MCHC RBC-ENTMCNC: 29.2 PG — SIGNIFICANT CHANGE UP (ref 27–34)
MCHC RBC-ENTMCNC: 34 G/DL — SIGNIFICANT CHANGE UP (ref 32–36)
MCV RBC AUTO: 85.8 FL — SIGNIFICANT CHANGE UP (ref 80–100)
NON HDL CHOLESTEROL: 98 MG/DL — SIGNIFICANT CHANGE UP
NRBC # BLD: 0 /100 WBCS — SIGNIFICANT CHANGE UP (ref 0–0)
PHOSPHATE SERPL-MCNC: 3.2 MG/DL — SIGNIFICANT CHANGE UP (ref 2.5–4.5)
PLATELET # BLD AUTO: 173 K/UL — SIGNIFICANT CHANGE UP (ref 150–400)
POTASSIUM SERPL-MCNC: 3.8 MMOL/L — SIGNIFICANT CHANGE UP (ref 3.5–5.3)
POTASSIUM SERPL-SCNC: 3.8 MMOL/L — SIGNIFICANT CHANGE UP (ref 3.5–5.3)
PROCALCITONIN SERPL-MCNC: 0.76 NG/ML — HIGH (ref 0.02–0.1)
PROT SERPL-MCNC: 6.8 GM/DL — SIGNIFICANT CHANGE UP (ref 6–8.3)
RBC # BLD: 3.67 M/UL — LOW (ref 3.8–5.2)
RBC # FLD: 14 % — SIGNIFICANT CHANGE UP (ref 10.3–14.5)
SODIUM SERPL-SCNC: 131 MMOL/L — LOW (ref 135–145)
T4 FREE SERPL-MCNC: 1.2 NG/DL — SIGNIFICANT CHANGE UP (ref 0.9–1.8)
TRIGL SERPL-MCNC: 113 MG/DL — SIGNIFICANT CHANGE UP
TSH SERPL-MCNC: 0.1 UIU/ML — LOW (ref 0.36–3.74)
WBC # BLD: 4.84 K/UL — SIGNIFICANT CHANGE UP (ref 3.8–10.5)
WBC # FLD AUTO: 4.84 K/UL — SIGNIFICANT CHANGE UP (ref 3.8–10.5)

## 2023-05-17 PROCEDURE — 99233 SBSQ HOSP IP/OBS HIGH 50: CPT

## 2023-05-17 PROCEDURE — 99222 1ST HOSP IP/OBS MODERATE 55: CPT

## 2023-05-17 PROCEDURE — 99232 SBSQ HOSP IP/OBS MODERATE 35: CPT

## 2023-05-17 RX ORDER — ONDANSETRON 8 MG/1
8 TABLET, FILM COATED ORAL EVERY 6 HOURS
Refills: 0 | Status: DISCONTINUED | OUTPATIENT
Start: 2023-05-17 | End: 2023-05-18

## 2023-05-17 RX ADMIN — ONDANSETRON 8 MILLIGRAM(S): 8 TABLET, FILM COATED ORAL at 15:42

## 2023-05-17 RX ADMIN — Medication 1 MILLIGRAM(S): at 03:16

## 2023-05-17 RX ADMIN — Medication 600 MILLIGRAM(S): at 17:37

## 2023-05-17 RX ADMIN — Medication 20 MILLIGRAM(S): at 06:51

## 2023-05-17 RX ADMIN — Medication 600 MILLIGRAM(S): at 06:50

## 2023-05-17 RX ADMIN — BUDESONIDE AND FORMOTEROL FUMARATE DIHYDRATE 2 PUFF(S): 160; 4.5 AEROSOL RESPIRATORY (INHALATION) at 17:37

## 2023-05-17 RX ADMIN — ENOXAPARIN SODIUM 40 MILLIGRAM(S): 100 INJECTION SUBCUTANEOUS at 17:36

## 2023-05-17 RX ADMIN — OXYCODONE HYDROCHLORIDE 10 MILLIGRAM(S): 5 TABLET ORAL at 04:05

## 2023-05-17 RX ADMIN — LORATADINE 10 MILLIGRAM(S): 10 TABLET ORAL at 13:58

## 2023-05-17 RX ADMIN — Medication 20 MILLIGRAM(S): at 21:06

## 2023-05-17 RX ADMIN — Medication 3 MILLILITER(S): at 17:15

## 2023-05-17 RX ADMIN — MONTELUKAST 10 MILLIGRAM(S): 4 TABLET, CHEWABLE ORAL at 13:58

## 2023-05-17 RX ADMIN — OXYCODONE HYDROCHLORIDE 10 MILLIGRAM(S): 5 TABLET ORAL at 19:39

## 2023-05-17 RX ADMIN — Medication 25 MILLIGRAM(S): at 06:51

## 2023-05-17 RX ADMIN — CYCLOBENZAPRINE HYDROCHLORIDE 5 MILLIGRAM(S): 10 TABLET, FILM COATED ORAL at 10:35

## 2023-05-17 RX ADMIN — Medication 1 PATCH: at 12:17

## 2023-05-17 RX ADMIN — BUDESONIDE AND FORMOTEROL FUMARATE DIHYDRATE 2 PUFF(S): 160; 4.5 AEROSOL RESPIRATORY (INHALATION) at 07:58

## 2023-05-17 RX ADMIN — OXYCODONE HYDROCHLORIDE 10 MILLIGRAM(S): 5 TABLET ORAL at 20:30

## 2023-05-17 RX ADMIN — RISPERIDONE 2 MILLIGRAM(S): 4 TABLET ORAL at 13:57

## 2023-05-17 RX ADMIN — ATORVASTATIN CALCIUM 80 MILLIGRAM(S): 80 TABLET, FILM COATED ORAL at 21:03

## 2023-05-17 RX ADMIN — Medication 1 PATCH: at 19:36

## 2023-05-17 RX ADMIN — Medication 3 MILLILITER(S): at 11:02

## 2023-05-17 RX ADMIN — Medication 3 MILLILITER(S): at 05:06

## 2023-05-17 RX ADMIN — SENNA PLUS 2 TABLET(S): 8.6 TABLET ORAL at 21:03

## 2023-05-17 RX ADMIN — Medication 25 MICROGRAM(S): at 06:51

## 2023-05-17 RX ADMIN — Medication 1 PATCH: at 22:20

## 2023-05-17 RX ADMIN — Medication 81 MILLIGRAM(S): at 12:17

## 2023-05-17 RX ADMIN — LOSARTAN POTASSIUM 25 MILLIGRAM(S): 100 TABLET, FILM COATED ORAL at 06:51

## 2023-05-17 RX ADMIN — OXYCODONE HYDROCHLORIDE 10 MILLIGRAM(S): 5 TABLET ORAL at 03:16

## 2023-05-17 RX ADMIN — Medication 650 MILLIGRAM(S): at 07:41

## 2023-05-17 RX ADMIN — Medication 650 MILLIGRAM(S): at 00:06

## 2023-05-17 RX ADMIN — Medication 20 MILLIGRAM(S): at 14:41

## 2023-05-17 RX ADMIN — Medication 650 MILLIGRAM(S): at 07:33

## 2023-05-17 NOTE — BH CONSULTATION LIAISON ASSESSMENT NOTE - NSBHCHARTREVIEWLAB_PSY_A_CORE FT
05-17    131<L>  |  104  |  28<H>  ----------------------------<  133<H>  3.8   |  23  |  0.97    Ca    8.7      17 May 2023 07:49  Phos  3.2     05-17  Mg     2.9     05-17    TPro  6.8  /  Alb  2.7<L>  /  TBili  0.4  /  DBili  x   /  AST  34  /  ALT  23  /  AlkPhos  62  05-17

## 2023-05-17 NOTE — BH CONSULTATION LIAISON ASSESSMENT NOTE - NSBHCHARTREVIEWINVESTIGATE_PSY_A_CORE FT
CT Angio Chest PE Protocol w/ IV Cont (05.16.23) Emphysema. Chronic interstitial changes. Question superimposed active disease seen as areas of groundglass opacity

## 2023-05-17 NOTE — BH CONSULTATION LIAISON ASSESSMENT NOTE - NSBHCONSULTRECOMMENDOTHER_PSY_A_CORE FT
- continue Xanax 1mg PO bid, risperdal 2mg PO qhs though suspecting Pt's Bipolar Diagnosis is not accurate   - will provide information about rescue organizations that can take Patient's beloved service dog as she does not want to put her down just because she can no longer take care of her

## 2023-05-17 NOTE — BH CONSULTATION LIAISON ASSESSMENT NOTE - RISK ASSESSMENT
Chronic risk factors: age > 65 yrs; severe medical issues impacting everyday life/quality of life/resulting in loss of independent living; financial constraints; chronic relationship difficulty with her children. Protective factors: female gender; medication and treatment compliant; no suicide attempts; no self-injurious behavior; no hx of aggression/violence; no substance use; o legal issues; motivated for help; has family support; beloved pets; access to health services. No acute risk factors identified

## 2023-05-17 NOTE — BH CONSULTATION LIAISON ASSESSMENT NOTE - NSBHCHARTREVIEWVS_PSY_A_CORE FT
Vital Signs Last 24 Hrs  T(C): 36.3 (17 May 2023 11:04), Max: 37.1 (16 May 2023 14:25)  T(F): 97.4 (17 May 2023 11:04), Max: 98.8 (16 May 2023 14:25)  HR: 84 (17 May 2023 11:25) (68 - 120)  BP: 131/71 (17 May 2023 11:04) (95/30 - 131/71)  BP(mean): 76 (16 May 2023 16:20) (74 - 76)  RR: 18 (17 May 2023 11:04) (14 - 21)  SpO2: 98% (17 May 2023 11:25) (94% - 98%)    Parameters below as of 17 May 2023 11:25  Patient On (Oxygen Delivery Method): nasal cannula

## 2023-05-17 NOTE — BH CONSULTATION LIAISON ASSESSMENT NOTE - OTHER
very raspy, out of breath voice  reflective of topic at hand "I had enough of my daughter"  reactive  deferred

## 2023-05-17 NOTE — BH CONSULTATION LIAISON ASSESSMENT NOTE - SUMMARY
Multi-factorial Anxiety  (normal physiological reaction due to severe emphysema/chronically impaired oxygenation + in addition to situational anxiousness triggered by home life and intrapersonal relationship with daughter).

## 2023-05-17 NOTE — PROGRESS NOTE ADULT - PROBLEM SELECTOR PLAN 1
SOB, cough and wheezing  acute on chronic hypoxic resp failure   Tachycardic, tachypneic, still very SOB with wheezing  Received Dexamethasone 12mg IM, duoneb x 3 with EMS  likely in the setting of parainfluenza infection  continue IV solumedrol 20mg q8hr  Duoneb   continue Symbicort and singular  closely monitor respiratory status given h/o multiple intubation before  pulmonary consulted

## 2023-05-17 NOTE — BH CONSULTATION LIAISON ASSESSMENT NOTE - NSSUICPROTFACT_PSY_ALL_CORE
Responsibility to children, family, or others/Identifies reasons for living/Supportive social network of family or friends/Fear of death or the actual act of killing self/Cultural, spiritual and/or moral attitudes against suicide/Quaker beliefs/Beloved pets

## 2023-05-17 NOTE — BH CONSULTATION LIAISON ASSESSMENT NOTE - CURRENT MEDICATION
MEDICATIONS  (STANDING):  albuterol/ipratropium for Nebulization 3 milliLiter(s) Nebulizer every 6 hours  aspirin enteric coated 81 milliGRAM(s) Oral daily  atorvastatin 80 milliGRAM(s) Oral at bedtime  budesonide 160 MICROgram(s)/formoterol 4.5 MICROgram(s) Inhaler 2 Puff(s) Inhalation two times a day  enoxaparin Injectable 40 milliGRAM(s) SubCutaneous every 24 hours  guaiFENesin  milliGRAM(s) Oral every 12 hours  levothyroxine 25 MICROGram(s) Oral daily  loratadine 10 milliGRAM(s) Oral daily  losartan 25 milliGRAM(s) Oral daily  methylPREDNISolone sodium succinate Injectable 20 milliGRAM(s) IV Push every 8 hours  metoprolol succinate ER 25 milliGRAM(s) Oral daily  montelukast 10 milliGRAM(s) Oral daily  nicotine -  14 mG/24Hr(s) Patch 1 Patch Transdermal daily  risperiDONE  Disintegrating Tablet 2 milliGRAM(s) Oral daily  senna 2 Tablet(s) Oral at bedtime    MEDICATIONS  (PRN):  acetaminophen     Tablet .. 650 milliGRAM(s) Oral every 6 hours PRN Mild Pain (1 - 3), Moderate Pain (4 - 6)  ALPRAZolam 1 milliGRAM(s) Oral two times a day PRN anxiety  benzonatate 100 milliGRAM(s) Oral three times a day PRN Cough  cyclobenzaprine 5 milliGRAM(s) Oral three times a day PRN Muscle Spasm  melatonin 3 milliGRAM(s) Oral at bedtime PRN Insomnia  oxyCODONE    IR 10 milliGRAM(s) Oral every 8 hours PRN Severe Pain (7 - 10)

## 2023-05-17 NOTE — BH CONSULTATION LIAISON ASSESSMENT NOTE - HPI (INCLUDE ILLNESS QUALITY, SEVERITY, DURATION, TIMING, CONTEXT, MODIFYING FACTORS, ASSOCIATED SIGNS AND SYMPTOMS)
68yo F, with PMH of HTN, Asthma, CHD,  Hypothyroidism, cervical fusion, s/p cervical spine surgery (Jun 2020), emphysema, hx of intubation, is an active smoker, admitted for SOB.     [facility] no record of Patient   ISTOP Reference #: [id number]  A	N	N	O	01/18/2023	01/21/2023	oxycodone-acetaminophen  mg tab	90	30	Elvin, Eleanor TRAN	N	N	O	12/16/2022	12/19/2022	oxycodone-acetaminophen  mg tab	90	30	Elvin, Eleanor TRAN	N	N	O	11/16/2022	11/21/2022	oxycodone-acetaminophen  mg tab	90	30	Elvin, Eleanor TRAN	N	N	O	10/20/2022	10/21/2022	oxycodone-acetaminophen  mg tab	90	30	Elvin, Eleanor Daniel  A	N	N	O	09/16/2022	09/22/2022	oxycodone-acetaminophen  mg tab	90	30	Elvin, Eleanor TRAN	N	N	O	08/19/2022	08/22/2022	oxycodone-acetaminophen  mg tab	90	30	Elvin, Eleanor TRAN	N	N	O	07/19/2022	07/22/2022	oxycodone-acetaminophen  mg tab	90	30	Elvin, Eleanor Tristan  B	N	Y	B	04/17/2023	04/17/2023	alprazolam 1 mg tablet	60	30	John Saldana	[id number]	Insurance	[place]  66yo AA, single, noncaregiver, mother of 3 adult children, currently living with one of her daughters in a subsidized apartment in which she has one windowless room with low ventilation exacerbating her COPD (also because she reports that her daughter smokes Marijuana daily and the smoke goes into her room), has 2 service dogs she had for last 13 years but is no longer able to take care of, reports that this current daughter is her paid HHA but "she does not do anything," previously was able to love alone (had left over funds from a settlement, lost place once money ran out), wants to love alone but cannot due to finances, reports that she has a "Bipolar diagnosis" (denies psych admissions, SA/SI), and sees Family Practice NP Glory Liriano in [place] for all of her medications including psych ones,  with PMH of HTN, Asthma, CHD,  Hypothyroidism, cervical fusion, s/p cervical spine surgery (Jun 2020), emphysema, hx of intubation, is an active smoker, admitted for SOB.     EXAM: hoarse voice with continued struggle while speaking (likely baseline SOB despite NC), main complaints about her daughter and living condition. She got tearful when discussing her 12 yo service dog whom she loves, brought her happiness and "gives me actual hugs" and thought about having to put her down as Patient is no longer able to take care of her. Patient looked relieved when told that there are organizations that take in service dogs/keep retired service dogs and age of the dog does not matter. Otherwise, Patient reports that she has anxiety because of her kids in general, feels like some are greedy and turned out in a way she does not like. Denies suicidality.     [facility] no record of Patient   ISTOP Reference #: [id number]  A	N	N	O	01/18/2023	01/21/2023	oxycodone-acetaminophen  mg tab	90	30	Eleanor Oconnor MD	N	N	O	12/16/2022	12/19/2022	oxycodone-acetaminophen  mg tab	90	30	Eleanor Oconnor MD	N	O	11/16/2022	11/21/2022	oxycodone-acetaminophen  mg tab	90	30	Eleanor Oconnor MD	N	N	O	10/20/2022	10/21/2022	oxycodone-acetaminophen  mg tab	90	30	ElvinEleanor chester	N	O	09/16/2022	09/22/2022	oxycodone-acetaminophen  mg tab	90	30	ElvinEleanor chester MD	N	O	08/19/2022	08/22/2022	oxycodone-acetaminophen  mg tab	90	30	ElvinEleanor chester MD	N	O	07/19/2022	07/22/2022	oxycodone-acetaminophen  mg tab	90	30	ElvinEleanor chester  B	N	Y	B	04/17/2023	04/17/2023	alprazolam 1 mg tablet	60	30	John Saldana	[id number]	[facility]

## 2023-05-18 VITALS — OXYGEN SATURATION: 97 %

## 2023-05-18 LAB
GLUCOSE BLDC GLUCOMTR-MCNC: 168 MG/DL — HIGH (ref 70–99)
HCV RNA FLD QL NAA+PROBE: SIGNIFICANT CHANGE UP
HCV RNA FLD QL NAA+PROBE: SIGNIFICANT CHANGE UP
HCV RNA SPEC QL PROBE+SIG AMP: SIGNIFICANT CHANGE UP
HCV RNA SPEC QL PROBE+SIG AMP: SIGNIFICANT CHANGE UP

## 2023-05-18 RX ADMIN — Medication 3 MILLILITER(S): at 11:08

## 2023-05-18 RX ADMIN — MONTELUKAST 10 MILLIGRAM(S): 4 TABLET, CHEWABLE ORAL at 11:32

## 2023-05-18 RX ADMIN — Medication 25 MILLIGRAM(S): at 05:13

## 2023-05-18 RX ADMIN — CYCLOBENZAPRINE HYDROCHLORIDE 5 MILLIGRAM(S): 10 TABLET, FILM COATED ORAL at 00:18

## 2023-05-18 RX ADMIN — LOSARTAN POTASSIUM 25 MILLIGRAM(S): 100 TABLET, FILM COATED ORAL at 05:13

## 2023-05-18 RX ADMIN — Medication 600 MILLIGRAM(S): at 05:13

## 2023-05-18 RX ADMIN — Medication 25 MICROGRAM(S): at 05:13

## 2023-05-18 RX ADMIN — LORATADINE 10 MILLIGRAM(S): 10 TABLET ORAL at 11:32

## 2023-05-18 RX ADMIN — RISPERIDONE 2 MILLIGRAM(S): 4 TABLET ORAL at 11:32

## 2023-05-18 RX ADMIN — Medication 1 MILLIGRAM(S): at 05:19

## 2023-05-18 RX ADMIN — Medication 1 PATCH: at 11:31

## 2023-05-18 RX ADMIN — OXYCODONE HYDROCHLORIDE 10 MILLIGRAM(S): 5 TABLET ORAL at 05:25

## 2023-05-18 RX ADMIN — Medication 81 MILLIGRAM(S): at 11:33

## 2023-05-18 RX ADMIN — Medication 20 MILLIGRAM(S): at 05:19

## 2023-05-18 RX ADMIN — BUDESONIDE AND FORMOTEROL FUMARATE DIHYDRATE 2 PUFF(S): 160; 4.5 AEROSOL RESPIRATORY (INHALATION) at 05:14

## 2023-05-18 NOTE — CHART NOTE - NSCHARTNOTEFT_GEN_A_CORE
The patient has decided to leave against medical advice because she wants to go home.  They have normal mental status and adequate capacity to make medical decisions.  The patient refuses hospital admission and wants to be discharged.  The risks have been explained to the patient, including worsening illness, chronic pain, permanent disability and death.  The benefits of admission have also been explained, including the availability and proximity of nurses, physicians, monitoring, diagnostic testing, treatment.  The patient was able to understand and state the risks and benefits of hospital admission. This was witnessed by nurse and me.  They had the opportunity to ask questions about their medical condition.  Follow-up has been discussed and patient will follow up w/ PCP.    AMA form was signed and witnessed by the nurse. The patient has decided to leave against medical advice because she wants to go home.  They have normal mental status and adequate capacity to make medical decisions.  The patient refuses hospital admission and wants to be discharged.  The risks have been explained to the patient, including worsening illness, chronic pain, permanent disability and death.  The benefits of admission have also been explained, including the availability and proximity of nurses, physicians, monitoring, diagnostic testing, treatment.  The patient was able to understand and state the risks and benefits of hospital admission. This was witnessed by nurse and me.  They had the opportunity to ask questions about their medical condition.  Follow-up has been discussed and patient will follow up w/ PCP.    AMA form was signed and witnessed by the nurse.  Dr. Raygoza made aware.

## 2023-05-18 NOTE — PROGRESS NOTE ADULT - ASSESSMENT
BEST PRACTICE ISSUES.    HOB ELEVATN.   .. Yes  DVT PPLX.   ..   5/16/2023 lvnx 40    STRESS ULCER PPLX   ..      INFN PPLX.   ..    SP SW MIRZA.    ..        DIET.    ..  5/16/2023 DASH   IV fl.  ..      PROCEDURES.  ..   PAST PROCEDURES.    ..     GENERAL DATA .   GOC.    .. 5/16/2023 full code      ALLGY.  ..     morphine pncl                     WT.  ..  5/16/2023 52  BMI.  ..  5/16/2023 21                 ICU STAY.   .. none  COVID.   ..  scv2 5/16/2023 (-)     ABGS.  5/16/2023 .28 746/27/67     VS/ PO/IO/ VENT/ DRIPS.   5/18/2023 afeb 66 120/70   5/17/2023 afeb 68 110/60     OVERALL.  . 67 years old female with h/o COPD, HTN, HLD, anxiety/depression, active smoker, low back pain, s/p spine surgeries present to ED with complain of SOB.   SH: active smoker since age of 14 years old  . Pulm consulted 5/16/2023     PROBLEMS .   . RESP FAILURE   . PARAINFLUENZA 3 RESP TRACT INFECTION 5/16   . SMOKER   . COPD EX   . RO VTE   . HYPONATREMIA 5/17/2023 Na 131     PMH.   . COPD,   . active smoker  . HTN, HLD,   . anxiety/depression,   . low back pain, s/p spine surgeries    CURRENT ISSUES.  .  COPD ex on bd steroids   . Type 1 Resp failure On O2   . Smoker counseled started on nicotine patch   . Parainfluenza 5/16 supp care   . Pt was given my card and advsed to see me in office   .    TIME SPENT   Over 25 minutes aggregate care time spent on encounter; activities included   direct patient care, counseling and/or coordinating care reviewing notes, lab data/ imaging , discussion with multidisciplinary team/ patient  /family and explaining in detail risks, benefits, alternatives  of the recommendations     MARÍA WILKINSON 67 f 5/16/2023 1955 DR MARISELA CHOI     
    REASON FOR VISIT  .. Management of problems listed below      NOTEWORTHY FINDINGS.    REVIEW OF SYMPTOMS   Able to give ROS  Yes     RELIABILITY +/-   CONSTITUTIONAL Weakness Yes    ENDOCRINE  No heat or cold intolerance    ALLERGY No hives  Sore throat No stridor  RESP Shortness of breath YES   NEURO New weakness No   CARDIAC   Palpitations No         PHYSICAL EXAM    HEENT Unremarkable  atraumatic   RESP Fair air entry  Harsh breath sound   CARDIAC S1 S2 No S3     NO JVD    ABDOMEN No hepatosplenomegaly   PEDAL EDEMA present No calf tenderness  NO rash       BEST PRACTICE ISSUES.    HOB ELEVATN.   .. Yes  DVT PPLX.   ..   5/16/2023 lvnx 40    STRESS ULCER PPLX   ..      INFN PPLX.   ..    SP SW MIRZA.    ..        DIET.    ..  5/16/2023 DASH   IV fl.  ..      PROCEDURES.  ..   PAST PROCEDURES.    ..     GENERAL DATA .   GOC.    .. 5/16/2023 full code      ALLGY.  ..     morphine pncl                     WT.  ..  5/16/2023 52  BMI.  ..  5/16/2023 21                 ICU STAY.   .. none  COVID.   ..  scv2 5/16/2023 (-)     ABGS.  5/16/2023 .28 746/27/67     VS/ PO/IO/ VENT/ DRIPS.   5/17/2023 afeb 68 110/60   5/17/2023 nc 97%     OVERALL.  . 67 years old female with h/o COPD, HTN, HLD, anxiety/depression, active smoker, low back pain, s/p spine surgeries present to ED with complain of SOB.   SH: active smoker since age of 14 years old  . Pulm consulted 5/16/2023     PROBLEMS .   . RESP FAILURE   . PARAINFLUENZA 3 RESP TRACT INFECTION 5/16   . SMOKER   . COPD EX   . RO VTE   . HYPONATREMIA 5/17/2023 Na 131     PMH.   . COPD,   . active smoker  . HTN, HLD,   . anxiety/depression,   . low back pain, s/p spine surgeries    CURRENT ISSUES.  .  COPD ex on bd steroids   . Type 1 Resp failure On O2   . Smoker counseled started on nicotine patch   . Parainfluenza 5/16 supp care   .    TIME SPENT   Over 25 minutes aggregate care time spent on encounter; activities included   direct patient care, counseling and/or coordinating care reviewing notes, lab data/ imaging , discussion with multidisciplinary team/ patient  /family and explaining in detail risks, benefits, alternatives  of the recommendations     MARÍA WILKINSON 67 f 5/16/2023 1955 DR MARISELA CHOI     
67 years old female with h/o COPD, HTN, HLD, anxiety/depression, active smoker, low back pain, s/p spine surgeries present to ED with complain of SOB. Patient reported SOB for last 1-2 weeks, which significant worsened over last 3 days, associated with wheezing, productive cough. Patient also reported pleuritic chest pain with coughing. Reported feverish feeling.   Tachycardic, tachypneic, sat well with 2L NC. EKG with sinus tachy. No leukocytosis, plt 190, K 3.8, Cr 1.09, glucose 116, lactate 2.1, ABG with pH 7.46, pCO2 27. RVP positive for parainfluenza virus. CXR image reviewed, bilateral Lower lung infiltrates noted. ? mass like opacity    Admitted with COPD exacerbation

## 2023-05-18 NOTE — PROGRESS NOTE ADULT - SUBJECTIVE AND OBJECTIVE BOX
CHIEF COMPLAINT/ REASON FOR VISIT  .. Patient was seen to address the  issue listed under PROBLEM LIST which is located toward bottom of this note     MINH DANIEL    S 2C 248 I    Allergies    morphine (Rash)  penicillin (Anaphylaxis)    Intolerances        PAST MEDICAL & SURGICAL HISTORY:  Chronic back pain      Diabetes mellitus      Hypertension      HLD (hyperlipidemia)      COPD, severity to be determined          FAMILY HISTORY:      Home Medications:  ALPRAZolam 1 mg oral tablet: 1 tab(s) orally 2 times a day (16 May 2023 15:00)  aspirin 81 mg oral tablet: 1 tab(s) orally once a day (16 May 2023 15:01)  cyclobenzaprine 10 mg oral tablet: 1 tab(s) orally 2 times a day (16 May 2023 15:00)  hydroCHLOROthiazide 12.5 mg oral tablet: 1 tab(s) orally once a day (16 May 2023 15:01)  levothyroxine 25 mcg (0.025 mg) oral tablet: 0.5 tab(s) orally once a day (16 May 2023 15:00)  loratadine 10 mg oral tablet: 1 tab(s) orally once a day (16 May 2023 15:00)  losartan 25 mg oral tablet: 1 tab(s) orally once a day (16 May 2023 15:00)  metoprolol succinate 25 mg oral tablet, extended release: 0.5 tab(s) orally once a day (16 May 2023 15:00)  montelukast 10 mg oral tablet: 1 tab(s) orally once a day (16 May 2023 15:00)  oxycodone-acetaminophen 10 mg-325 mg oral tablet: 1 tab(s) orally every 8 hours as needed for  severe pain (16 May 2023 15:00)  risperiDONE 2 mg oral tablet, disintegratin tab(s) orally once a day (16 May 2023 15:01)  rosuvastatin 20 mg oral tablet: 1 tab(s) orally once a day (16 May 2023 15:01)  Vitamin D3 125 mcg (5000 intl units) oral capsule: 1 tab(s) orally once a day (16 May 2023 15:01)      MEDICATIONS  (STANDING):  albuterol/ipratropium for Nebulization 3 milliLiter(s) Nebulizer every 6 hours  aspirin enteric coated 81 milliGRAM(s) Oral daily  atorvastatin 80 milliGRAM(s) Oral at bedtime  budesonide 160 MICROgram(s)/formoterol 4.5 MICROgram(s) Inhaler 2 Puff(s) Inhalation two times a day  enoxaparin Injectable 40 milliGRAM(s) SubCutaneous every 24 hours  guaiFENesin  milliGRAM(s) Oral every 12 hours  levothyroxine 25 MICROGram(s) Oral daily  loratadine 10 milliGRAM(s) Oral daily  losartan 25 milliGRAM(s) Oral daily  methylPREDNISolone sodium succinate Injectable 20 milliGRAM(s) IV Push every 8 hours  metoprolol succinate ER 25 milliGRAM(s) Oral daily  montelukast 10 milliGRAM(s) Oral daily  nicotine -  14 mG/24Hr(s) Patch 1 Patch Transdermal daily  risperiDONE  Disintegrating Tablet 2 milliGRAM(s) Oral daily  senna 2 Tablet(s) Oral at bedtime    MEDICATIONS  (PRN):  acetaminophen     Tablet .. 650 milliGRAM(s) Oral every 6 hours PRN Mild Pain (1 - 3), Moderate Pain (4 - 6)  ALPRAZolam 1 milliGRAM(s) Oral two times a day PRN anxiety  benzonatate 100 milliGRAM(s) Oral three times a day PRN Cough  cyclobenzaprine 5 milliGRAM(s) Oral three times a day PRN Muscle Spasm  melatonin 3 milliGRAM(s) Oral at bedtime PRN Insomnia  oxyCODONE    IR 10 milliGRAM(s) Oral every 8 hours PRN Severe Pain (7 - 10)      Diet, DASH/TLC:   Sodium & Cholesterol Restricted (23 @ 15:00) [Active]          Vital Signs Last 24 Hrs  T(C): 36.8 (17 May 2023 04:38), Max: 39.2 (16 May 2023 11:20)  T(F): 98.2 (17 May 2023 04:38), Max: 102.6 (16 May 2023 11:20)  HR: 68 (17 May 2023 04:38) (68 - 141)  BP: 110/62 (17 May 2023 04:38) (95/30 - 121/55)  BP(mean): 76 (16 May 2023 16:20) (74 - 76)  RR: 18 (17 May 2023 04:38) (14 - 31)  SpO2: 97% (17 May 2023 04:38) (92% - 98%)    Parameters below as of 17 May 2023 04:38  Patient On (Oxygen Delivery Method): nasal cannula          23 @ 07:01  -  23 @ 05:01  --------------------------------------------------------  IN: 0 mL / OUT: 400 mL / NET: -400 mL              LABS:                        12.4   6.91  )-----------( 190      ( 16 May 2023 11:20 )             36.5         135  |  104  |  28<H>  ----------------------------<  116<H>  3.8   |  25  |  1.09    Ca    9.6      16 May 2023 11:20  Mg     2.2         TPro  7.6  /  Alb  3.2<L>  /  TBili  0.9  /  DBili  x   /  AST  32  /  ALT  23  /  AlkPhos  69      PT/INR - ( 16 May 2023 11:20 )   PT: 15.4 sec;   INR: 1.29 ratio         PTT - ( 16 May 2023 11:20 )  PTT:34.2 sec  Urinalysis Basic - ( 16 May 2023 17:50 )    Color: Yellow / Appearance: Clear / S.010 / pH: x  Gluc: x / Ketone: Trace  / Bili: Negative / Urobili: Negative mg/dL   Blood: x / Protein: 30 mg/dL / Nitrite: Negative   Leuk Esterase: Negative / RBC: 3-5 /HPF / WBC Negative   Sq Epi: x / Non Sq Epi: x / Bacteria: Many        ABG - ( 16 May 2023 11:59 )  pH, Arterial: 7.46  pH, Blood: x     /  pCO2: 27    /  pO2: 67    / HCO3: 19    / Base Excess: -3.0  /  SaO2: 96.0                WBC:  WBC Count: 6.91 K/uL ( @ 11:20)      MICROBIOLOGY:  RECENT CULTURES:              PT/INR - ( 16 May 2023 11:20 )   PT: 15.4 sec;   INR: 1.29 ratio         PTT - ( 16 May 2023 11:20 )  PTT:34.2 sec    Sodium:  Sodium, Serum: 135 mmol/L ( @ 11:20)      1.09 mg/dL  @ 11:20      Hemoglobin:  Hemoglobin: 12.4 g/dL ( @ 11:20)      Platelets: Platelet Count - Automated: 190 K/uL ( @ 11:20)      LIVER FUNCTIONS - ( 16 May 2023 11:20 )  Alb: 3.2 g/dL / Pro: 7.6 gm/dL / ALK PHOS: 69 U/L / ALT: 23 U/L / AST: 32 U/L / GGT: x             Urinalysis Basic - ( 16 May 2023 17:50 )    Color: Yellow / Appearance: Clear / S.010 / pH: x  Gluc: x / Ketone: Trace  / Bili: Negative / Urobili: Negative mg/dL   Blood: x / Protein: 30 mg/dL / Nitrite: Negative   Leuk Esterase: Negative / RBC: 3-5 /HPF / WBC Negative   Sq Epi: x / Non Sq Epi: x / Bacteria: Many        RADIOLOGY & ADDITIONAL STUDIES:      MICROBIOLOGY:  RECENT CULTURES:          
    CHIEF COMPLAINT/ REASON FOR VISIT  .. Patient was seen to address the  issue listed under PROBLEM LIST which is located toward bottom of this note     MINH FUENTESAMILCAR    LVS 2C 246 D    Allergies    morphine (Rash)  penicillin (Anaphylaxis)    Intolerances        PAST MEDICAL & SURGICAL HISTORY:  Chronic back pain      Diabetes mellitus      Hypertension      HLD (hyperlipidemia)      COPD, severity to be determined          FAMILY HISTORY:      Home Medications:  ALPRAZolam 1 mg oral tablet: 1 tab(s) orally 2 times a day (16 May 2023 15:00)  aspirin 81 mg oral tablet: 1 tab(s) orally once a day (16 May 2023 15:01)  cyclobenzaprine 10 mg oral tablet: 1 tab(s) orally 2 times a day (16 May 2023 15:00)  hydroCHLOROthiazide 12.5 mg oral tablet: 1 tab(s) orally once a day (16 May 2023 15:01)  levothyroxine 25 mcg (0.025 mg) oral tablet: 0.5 tab(s) orally once a day (16 May 2023 15:00)  loratadine 10 mg oral tablet: 1 tab(s) orally once a day (16 May 2023 15:00)  losartan 25 mg oral tablet: 1 tab(s) orally once a day (16 May 2023 15:00)  metoprolol succinate 25 mg oral tablet, extended release: 0.5 tab(s) orally once a day (16 May 2023 15:00)  montelukast 10 mg oral tablet: 1 tab(s) orally once a day (16 May 2023 15:00)  oxycodone-acetaminophen 10 mg-325 mg oral tablet: 1 tab(s) orally every 8 hours as needed for  severe pain (16 May 2023 15:00)  risperiDONE 2 mg oral tablet, disintegratin tab(s) orally once a day (16 May 2023 15:01)  rosuvastatin 20 mg oral tablet: 1 tab(s) orally once a day (16 May 2023 15:01)  Vitamin D3 125 mcg (5000 intl units) oral capsule: 1 tab(s) orally once a day (16 May 2023 15:01)      MEDICATIONS  (STANDING):  albuterol/ipratropium for Nebulization 3 milliLiter(s) Nebulizer every 6 hours  aspirin enteric coated 81 milliGRAM(s) Oral daily  atorvastatin 80 milliGRAM(s) Oral at bedtime  budesonide 160 MICROgram(s)/formoterol 4.5 MICROgram(s) Inhaler 2 Puff(s) Inhalation two times a day  enoxaparin Injectable 40 milliGRAM(s) SubCutaneous every 24 hours  guaiFENesin  milliGRAM(s) Oral every 12 hours  levothyroxine 25 MICROGram(s) Oral daily  loratadine 10 milliGRAM(s) Oral daily  losartan 25 milliGRAM(s) Oral daily  methylPREDNISolone sodium succinate Injectable 20 milliGRAM(s) IV Push every 8 hours  metoprolol succinate ER 25 milliGRAM(s) Oral daily  montelukast 10 milliGRAM(s) Oral daily  nicotine -  14 mG/24Hr(s) Patch 1 Patch Transdermal daily  risperiDONE  Disintegrating Tablet 2 milliGRAM(s) Oral daily  senna 2 Tablet(s) Oral at bedtime    MEDICATIONS  (PRN):  acetaminophen     Tablet .. 650 milliGRAM(s) Oral every 6 hours PRN Mild Pain (1 - 3), Moderate Pain (4 - 6)  ALPRAZolam 1 milliGRAM(s) Oral two times a day PRN anxiety  benzonatate 100 milliGRAM(s) Oral three times a day PRN Cough  cyclobenzaprine 5 milliGRAM(s) Oral three times a day PRN Muscle Spasm  melatonin 3 milliGRAM(s) Oral at bedtime PRN Insomnia  ondansetron Injectable 8 milliGRAM(s) IV Push every 6 hours PRN Nausea and/or Vomiting  oxyCODONE    IR 10 milliGRAM(s) Oral every 8 hours PRN Severe Pain (7 - 10)      Diet, DASH/TLC:   Sodium & Cholesterol Restricted (23 @ 15:00) [Active]          Vital Signs Last 24 Hrs  T(C): 36.6 (18 May 2023 10:50), Max: 37 (17 May 2023 15:44)  T(F): 97.8 (18 May 2023 10:50), Max: 98.6 (17 May 2023 15:44)  HR: 100 (18 May 2023 10:50) (66 - 105)  BP: 135/67 (18 May 2023 10:50) (109/72 - 149/74)  BP(mean): --  RR: 19 (18 May 2023 10:50) (18 - 22)  SpO2: 97% (18 May 2023 10:50) (92% - 98%)    Parameters below as of 18 May 2023 10:50  Patient On (Oxygen Delivery Method): nasal cannula          23 @ 07:01  -  23 @ 07:00  --------------------------------------------------------  IN: 0 mL / OUT: 501 mL / NET: -501 mL              LABS:                        10.7   4.84  )-----------( 173      ( 17 May 2023 07:49 )             31.5         131<L>  |  104  |  28<H>  ----------------------------<  133<H>  3.8   |  23  |  0.97    Ca    8.7      17 May 2023 07:49  Phos  3.2       Mg     2.9         TPro  6.8  /  Alb  2.7<L>  /  TBili  0.4  /  DBili  x   /  AST  34  /  ALT  23  /  AlkPhos  62      PT/INR - ( 16 May 2023 11:20 )   PT: 15.4 sec;   INR: 1.29 ratio         PTT - ( 16 May 2023 11:20 )  PTT:34.2 sec  Urinalysis Basic - ( 16 May 2023 17:50 )    Color: Yellow / Appearance: Clear / S.010 / pH: x  Gluc: x / Ketone: Trace  / Bili: Negative / Urobili: Negative mg/dL   Blood: x / Protein: 30 mg/dL / Nitrite: Negative   Leuk Esterase: Negative / RBC: 3-5 /HPF / WBC Negative   Sq Epi: x / Non Sq Epi: x / Bacteria: Many        ABG - ( 16 May 2023 11:59 )  pH, Arterial: 7.46  pH, Blood: x     /  pCO2: 27    /  pO2: 67    / HCO3: 19    / Base Excess: -3.0  /  SaO2: 96.0                WBC:  WBC Count: 4.84 K/uL ( @ 07:49)  WBC Count: 6.91 K/uL ( @ 11:20)      MICROBIOLOGY:  RECENT CULTURES:   .Blood Blood-Peripheral XXXX XXXX   No growth to date.     .Blood Blood-Peripheral XXXX XXXX   No growth to date.                PT/INR - ( 16 May 2023 11:20 )   PT: 15.4 sec;   INR: 1.29 ratio         PTT - ( 16 May 2023 11:20 )  PTT:34.2 sec    Sodium:  Sodium, Serum: 131 mmol/L ( @ 07:49)  Sodium, Serum: 135 mmol/L ( @ 11:20)      0.97 mg/dL  @ 07:49  1.09 mg/dL  @ 11:20      Hemoglobin:  Hemoglobin: 10.7 g/dL ( @ 07:49)  Hemoglobin: 12.4 g/dL ( @ 11:20)      Platelets: Platelet Count - Automated: 173 K/uL ( @ 07:49)  Platelet Count - Automated: 190 K/uL ( @ 11:20)      LIVER FUNCTIONS - ( 17 May 2023 07:49 )  Alb: 2.7 g/dL / Pro: 6.8 gm/dL / ALK PHOS: 62 U/L / ALT: 23 U/L / AST: 34 U/L / GGT: x             Urinalysis Basic - ( 16 May 2023 17:50 )    Color: Yellow / Appearance: Clear / S.010 / pH: x  Gluc: x / Ketone: Trace  / Bili: Negative / Urobili: Negative mg/dL   Blood: x / Protein: 30 mg/dL / Nitrite: Negative   Leuk Esterase: Negative / RBC: 3-5 /HPF / WBC Negative   Sq Epi: x / Non Sq Epi: x / Bacteria: Many        RADIOLOGY & ADDITIONAL STUDIES:      MICROBIOLOGY:  RECENT CULTURES:   .Blood Blood-Peripheral XXXX XXXX   No growth to date.     .Blood Blood-Peripheral XXXX XXXX   No growth to date.            
Patient is a 67y old  Female who presents with a chief complaint of COPD exacerbation (17 May 2023 05:00)      INTERVAL HPI/OVERNIGHT EVENTS:  none     MEDICATIONS  (STANDING):  albuterol/ipratropium for Nebulization 3 milliLiter(s) Nebulizer every 6 hours  aspirin enteric coated 81 milliGRAM(s) Oral daily  atorvastatin 80 milliGRAM(s) Oral at bedtime  budesonide 160 MICROgram(s)/formoterol 4.5 MICROgram(s) Inhaler 2 Puff(s) Inhalation two times a day  enoxaparin Injectable 40 milliGRAM(s) SubCutaneous every 24 hours  guaiFENesin  milliGRAM(s) Oral every 12 hours  levothyroxine 25 MICROGram(s) Oral daily  loratadine 10 milliGRAM(s) Oral daily  losartan 25 milliGRAM(s) Oral daily  methylPREDNISolone sodium succinate Injectable 20 milliGRAM(s) IV Push every 8 hours  metoprolol succinate ER 25 milliGRAM(s) Oral daily  montelukast 10 milliGRAM(s) Oral daily  nicotine -  14 mG/24Hr(s) Patch 1 Patch Transdermal daily  risperiDONE  Disintegrating Tablet 2 milliGRAM(s) Oral daily  senna 2 Tablet(s) Oral at bedtime    MEDICATIONS  (PRN):  acetaminophen     Tablet .. 650 milliGRAM(s) Oral every 6 hours PRN Mild Pain (1 - 3), Moderate Pain (4 - 6)  ALPRAZolam 1 milliGRAM(s) Oral two times a day PRN anxiety  benzonatate 100 milliGRAM(s) Oral three times a day PRN Cough  cyclobenzaprine 5 milliGRAM(s) Oral three times a day PRN Muscle Spasm  melatonin 3 milliGRAM(s) Oral at bedtime PRN Insomnia  ondansetron Injectable 8 milliGRAM(s) IV Push every 6 hours PRN Nausea and/or Vomiting  oxyCODONE    IR 10 milliGRAM(s) Oral every 8 hours PRN Severe Pain (7 - 10)      Allergies    morphine (Rash)  penicillin (Anaphylaxis)    Intolerances        REVIEW OF SYSTEMS:  CONSTITUTIONAL: No fever, weight loss  EYES: No eye pain, visual disturbances, or discharge  ENMT:  No difficulty hearing, tinnitus, vertigo; No sinus or throat pain  RESPIRATORY: No cough, wheezing, chills or hemoptysis; No shortness of breath  CARDIOVASCULAR: No chest pain, palpitations, dizziness, or leg swelling  GASTROINTESTINAL: No abdominal or epigastric pain. No nausea, vomiting, or hematemesis; No diarrhea or constipation. No melena or hematochezia.  GENITOURINARY: No dysuria, frequency, hematuria, or incontinence  NEUROLOGICAL: No headaches, memory loss, loss of strength, numbness, or tremors  SKIN: No itching, burning, rashes, or lesions   MUSCULOSKELETAL: No joint pain or swelling; No muscle, back, or extremity pain  PSYCHIATRIC: No depression, anxiety, mood swings, or difficulty sleeping  HEME/LYMPH: No easy bruising, or bleeding gums      Vital Signs Last 24 Hrs  T(C): 36.9 (17 May 2023 14:36), Max: 36.9 (17 May 2023 14:36)  T(F): 98.4 (17 May 2023 14:36), Max: 98.4 (17 May 2023 14:36)  HR: 101 (17 May 2023 14:36) (68 - 101)  BP: 128/74 (17 May 2023 14:36) (102/57 - 131/71)  BP(mean): 76 (16 May 2023 16:20) (74 - 76)  RR: 22 (17 May 2023 14:36) (14 - 22)  SpO2: 93% (17 May 2023 14:36) (93% - 98%)    Parameters below as of 17 May 2023 14:36  Patient On (Oxygen Delivery Method): nasal cannula  O2 Flow (L/min): 3        PHYSICAL EXAM:  GENERAL: NAD  HEAD:  Atraumatic, Normocephalic  EYES: EOMI, PERRLA, conjunctiva and sclera clear  ENMT: No tonsillar erythema, exudates, or enlargement;   NECK: Supple, Normal thyroid  NERVOUS SYSTEM:  Alert & Oriented X3, Good concentration; Motor Strength 5/5 B/L upper and lower extremities; DTRs 2+ intact and symmetric  CHEST/LUNG: diffuse wheezing, mild distress   HEART: Regular rate and rhythm; No murmurs, rubs, or gallops  ABDOMEN: Soft, Nontender, Nondistended; Bowel sounds present  EXTREMITIES:  2+ Peripheral Pulses, No clubbing, cyanosis, or edema  LYMPH: No lymphadenopathy noted  SKIN: No rashes or lesions    LABS:                        10.7   4.84  )-----------( 173      ( 17 May 2023 07:49 )             31.5         131<L>  |  104  |  28<H>  ----------------------------<  133<H>  3.8   |  23  |  0.97    Ca    8.7      17 May 2023 07:49  Phos  3.2       Mg     2.9         TPro  6.8  /  Alb  2.7<L>  /  TBili  0.4  /  DBili  x   /  AST  34  /  ALT  23  /  AlkPhos  62      PT/INR - ( 16 May 2023 11:20 )   PT: 15.4 sec;   INR: 1.29 ratio         PTT - ( 16 May 2023 11:20 )  PTT:34.2 sec  Urinalysis Basic - ( 16 May 2023 17:50 )    Color: Yellow / Appearance: Clear / S.010 / pH: x  Gluc: x / Ketone: Trace  / Bili: Negative / Urobili: Negative mg/dL   Blood: x / Protein: 30 mg/dL / Nitrite: Negative   Leuk Esterase: Negative / RBC: 3-5 /HPF / WBC Negative   Sq Epi: x / Non Sq Epi: x / Bacteria: Many      CAPILLARY BLOOD GLUCOSE      POCT Blood Glucose.: 202 mg/dL (16 May 2023 20:56)      RADIOLOGY & ADDITIONAL TESTS:    Imaging Personally Reviewed:  [ ] YES  [ ] NO    Consultant(s) Notes Reviewed:  [ ] YES  [ ] NO    Care Discussed with Consultants/Other Providers [ ] YES  [ ] NO

## 2023-05-18 NOTE — PROVIDER CONTACT NOTE (CRITICAL VALUE NOTIFICATION) - NAME OF MD/NP/PA/DO NOTIFIED:
Dr. Fonseca
Koh Intro Text (From The.....): A KOH prep was ordered and evaluated from
Showing: fungal hyphal elements: positive
Koh Procedure Text (Tissue Harvesting Technique): A 15-blade scalpel was used to scrape the skin. The skin scrapings were placed on a glass slide, covered with a coverslip and a KOH solution was applied.
Detail Level: Zone

## 2023-05-21 LAB
CULTURE RESULTS: SIGNIFICANT CHANGE UP
CULTURE RESULTS: SIGNIFICANT CHANGE UP
SPECIMEN SOURCE: SIGNIFICANT CHANGE UP
SPECIMEN SOURCE: SIGNIFICANT CHANGE UP

## 2023-05-23 DIAGNOSIS — M54.9 DORSALGIA, UNSPECIFIED: ICD-10-CM

## 2023-05-23 DIAGNOSIS — J44.1 CHRONIC OBSTRUCTIVE PULMONARY DISEASE WITH (ACUTE) EXACERBATION: ICD-10-CM

## 2023-05-23 DIAGNOSIS — J96.21 ACUTE AND CHRONIC RESPIRATORY FAILURE WITH HYPOXIA: ICD-10-CM

## 2023-05-23 DIAGNOSIS — Z79.82 LONG TERM (CURRENT) USE OF ASPIRIN: ICD-10-CM

## 2023-05-23 DIAGNOSIS — F32.A DEPRESSION, UNSPECIFIED: ICD-10-CM

## 2023-05-23 DIAGNOSIS — F17.210 NICOTINE DEPENDENCE, CIGARETTES, UNCOMPLICATED: ICD-10-CM

## 2023-05-23 DIAGNOSIS — E11.9 TYPE 2 DIABETES MELLITUS WITHOUT COMPLICATIONS: ICD-10-CM

## 2023-05-23 DIAGNOSIS — I10 ESSENTIAL (PRIMARY) HYPERTENSION: ICD-10-CM

## 2023-05-23 DIAGNOSIS — E78.5 HYPERLIPIDEMIA, UNSPECIFIED: ICD-10-CM

## 2023-05-23 DIAGNOSIS — F41.9 ANXIETY DISORDER, UNSPECIFIED: ICD-10-CM

## 2023-05-23 DIAGNOSIS — E87.1 HYPO-OSMOLALITY AND HYPONATREMIA: ICD-10-CM

## 2023-05-23 DIAGNOSIS — B34.8 OTHER VIRAL INFECTIONS OF UNSPECIFIED SITE: ICD-10-CM

## 2023-05-23 DIAGNOSIS — G89.29 OTHER CHRONIC PAIN: ICD-10-CM

## 2023-05-25 LAB — T3 SERPL-MCNC: 56 NG/DL — SIGNIFICANT CHANGE UP

## 2023-10-12 ENCOUNTER — INPATIENT (INPATIENT)
Facility: HOSPITAL | Age: 68
LOS: 0 days | Discharge: LEFT BEFORE DISCHARGE | End: 2023-10-13
Attending: STUDENT IN AN ORGANIZED HEALTH CARE EDUCATION/TRAINING PROGRAM | Admitting: STUDENT IN AN ORGANIZED HEALTH CARE EDUCATION/TRAINING PROGRAM
Payer: MEDICARE

## 2023-10-12 VITALS
OXYGEN SATURATION: 95 % | DIASTOLIC BLOOD PRESSURE: 69 MMHG | SYSTOLIC BLOOD PRESSURE: 157 MMHG | TEMPERATURE: 98 F | WEIGHT: 110.01 LBS | RESPIRATION RATE: 17 BRPM | HEART RATE: 85 BPM | HEIGHT: 60 IN

## 2023-10-12 DIAGNOSIS — Z98.890 OTHER SPECIFIED POSTPROCEDURAL STATES: Chronic | ICD-10-CM

## 2023-10-12 LAB
ALBUMIN SERPL ELPH-MCNC: 3.1 G/DL — LOW (ref 3.3–5)
ALP SERPL-CCNC: 84 U/L — SIGNIFICANT CHANGE UP (ref 40–120)
ALT FLD-CCNC: 19 U/L — SIGNIFICANT CHANGE UP (ref 12–78)
ANION GAP SERPL CALC-SCNC: 6 MMOL/L — SIGNIFICANT CHANGE UP (ref 5–17)
AST SERPL-CCNC: 25 U/L — SIGNIFICANT CHANGE UP (ref 15–37)
BASOPHILS # BLD AUTO: 0.05 K/UL — SIGNIFICANT CHANGE UP (ref 0–0.2)
BASOPHILS NFR BLD AUTO: 0.9 % — SIGNIFICANT CHANGE UP (ref 0–2)
BILIRUB SERPL-MCNC: 0.3 MG/DL — SIGNIFICANT CHANGE UP (ref 0.2–1.2)
BUN SERPL-MCNC: 12 MG/DL — SIGNIFICANT CHANGE UP (ref 7–23)
CALCIUM SERPL-MCNC: 8.6 MG/DL — SIGNIFICANT CHANGE UP (ref 8.5–10.1)
CHLORIDE SERPL-SCNC: 109 MMOL/L — HIGH (ref 96–108)
CO2 SERPL-SCNC: 26 MMOL/L — SIGNIFICANT CHANGE UP (ref 22–31)
CREAT SERPL-MCNC: 0.95 MG/DL — SIGNIFICANT CHANGE UP (ref 0.5–1.3)
EGFR: 66 ML/MIN/1.73M2 — SIGNIFICANT CHANGE UP
EOSINOPHIL # BLD AUTO: 0.27 K/UL — SIGNIFICANT CHANGE UP (ref 0–0.5)
EOSINOPHIL NFR BLD AUTO: 5.1 % — SIGNIFICANT CHANGE UP (ref 0–6)
GLUCOSE SERPL-MCNC: 136 MG/DL — HIGH (ref 70–99)
HCT VFR BLD CALC: 38.3 % — SIGNIFICANT CHANGE UP (ref 34.5–45)
HGB BLD-MCNC: 12.8 G/DL — SIGNIFICANT CHANGE UP (ref 11.5–15.5)
IMM GRANULOCYTES NFR BLD AUTO: 0.4 % — SIGNIFICANT CHANGE UP (ref 0–0.9)
LYMPHOCYTES # BLD AUTO: 1.82 K/UL — SIGNIFICANT CHANGE UP (ref 1–3.3)
LYMPHOCYTES # BLD AUTO: 34.1 % — SIGNIFICANT CHANGE UP (ref 13–44)
MCHC RBC-ENTMCNC: 29.8 PG — SIGNIFICANT CHANGE UP (ref 27–34)
MCHC RBC-ENTMCNC: 33.4 G/DL — SIGNIFICANT CHANGE UP (ref 32–36)
MCV RBC AUTO: 89.3 FL — SIGNIFICANT CHANGE UP (ref 80–100)
MONOCYTES # BLD AUTO: 0.38 K/UL — SIGNIFICANT CHANGE UP (ref 0–0.9)
MONOCYTES NFR BLD AUTO: 7.1 % — SIGNIFICANT CHANGE UP (ref 2–14)
NEUTROPHILS # BLD AUTO: 2.8 K/UL — SIGNIFICANT CHANGE UP (ref 1.8–7.4)
NEUTROPHILS NFR BLD AUTO: 52.4 % — SIGNIFICANT CHANGE UP (ref 43–77)
NRBC # BLD: 0 /100 WBCS — SIGNIFICANT CHANGE UP (ref 0–0)
PLATELET # BLD AUTO: 243 K/UL — SIGNIFICANT CHANGE UP (ref 150–400)
POTASSIUM SERPL-MCNC: 3.5 MMOL/L — SIGNIFICANT CHANGE UP (ref 3.5–5.3)
POTASSIUM SERPL-SCNC: 3.5 MMOL/L — SIGNIFICANT CHANGE UP (ref 3.5–5.3)
PROT SERPL-MCNC: 6.5 GM/DL — SIGNIFICANT CHANGE UP (ref 6–8.3)
RBC # BLD: 4.29 M/UL — SIGNIFICANT CHANGE UP (ref 3.8–5.2)
RBC # FLD: 13.7 % — SIGNIFICANT CHANGE UP (ref 10.3–14.5)
SODIUM SERPL-SCNC: 141 MMOL/L — SIGNIFICANT CHANGE UP (ref 135–145)
WBC # BLD: 5.34 K/UL — SIGNIFICANT CHANGE UP (ref 3.8–10.5)
WBC # FLD AUTO: 5.34 K/UL — SIGNIFICANT CHANGE UP (ref 3.8–10.5)

## 2023-10-12 PROCEDURE — 72131 CT LUMBAR SPINE W/O DYE: CPT | Mod: 26,MA

## 2023-10-12 PROCEDURE — 72192 CT PELVIS W/O DYE: CPT | Mod: 26,MA

## 2023-10-12 PROCEDURE — 99285 EMERGENCY DEPT VISIT HI MDM: CPT

## 2023-10-12 PROCEDURE — 99232 SBSQ HOSP IP/OBS MODERATE 35: CPT

## 2023-10-12 RX ORDER — HYDROMORPHONE HYDROCHLORIDE 2 MG/ML
1 INJECTION INTRAMUSCULAR; INTRAVENOUS; SUBCUTANEOUS ONCE
Refills: 0 | Status: DISCONTINUED | OUTPATIENT
Start: 2023-10-12 | End: 2023-10-12

## 2023-10-12 RX ORDER — LIDOCAINE 4 G/100G
1 CREAM TOPICAL ONCE
Refills: 0 | Status: COMPLETED | OUTPATIENT
Start: 2023-10-12 | End: 2023-10-12

## 2023-10-12 RX ORDER — HYDROCHLOROTHIAZIDE 25 MG
1 TABLET ORAL
Refills: 0 | DISCHARGE

## 2023-10-12 RX ORDER — CYCLOBENZAPRINE HYDROCHLORIDE 10 MG/1
1 TABLET, FILM COATED ORAL
Refills: 0 | DISCHARGE

## 2023-10-12 RX ORDER — ALPRAZOLAM 0.25 MG
1 TABLET ORAL
Refills: 0 | DISCHARGE

## 2023-10-12 RX ORDER — METHOCARBAMOL 500 MG/1
1000 TABLET, FILM COATED ORAL ONCE
Refills: 0 | Status: COMPLETED | OUTPATIENT
Start: 2023-10-12 | End: 2023-10-12

## 2023-10-12 RX ORDER — KETOROLAC TROMETHAMINE 30 MG/ML
15 SYRINGE (ML) INJECTION ONCE
Refills: 0 | Status: DISCONTINUED | OUTPATIENT
Start: 2023-10-12 | End: 2023-10-12

## 2023-10-12 RX ORDER — LEVOTHYROXINE SODIUM 125 MCG
0.5 TABLET ORAL
Refills: 0 | DISCHARGE

## 2023-10-12 RX ORDER — ASPIRIN/CALCIUM CARB/MAGNESIUM 324 MG
1 TABLET ORAL
Refills: 0 | DISCHARGE

## 2023-10-12 RX ORDER — MORPHINE SULFATE 50 MG/1
4 CAPSULE, EXTENDED RELEASE ORAL ONCE
Refills: 0 | Status: DISCONTINUED | OUTPATIENT
Start: 2023-10-12 | End: 2023-10-12

## 2023-10-12 RX ADMIN — METHOCARBAMOL 1000 MILLIGRAM(S): 500 TABLET, FILM COATED ORAL at 20:27

## 2023-10-12 RX ADMIN — Medication 15 MILLIGRAM(S): at 20:34

## 2023-10-12 RX ADMIN — MORPHINE SULFATE 4 MILLIGRAM(S): 50 CAPSULE, EXTENDED RELEASE ORAL at 21:04

## 2023-10-12 NOTE — ED PROVIDER NOTE - LOWER EXTREMITY EXAM, LEFT
+ Unable to fully range the LLE due to reported pain, pain w/ palpation overlying the left femoral head, no obvious bruising/deformity/ligamentous instability/joint swelling.

## 2023-10-12 NOTE — ED ADULT NURSE NOTE - NSFALLUNIVINTERV_ED_ALL_ED
Bed/Stretcher in lowest position, wheels locked, appropriate side rails in place/Call bell, personal items and telephone in reach/Instruct patient to call for assistance before getting out of bed/chair/stretcher/Non-slip footwear applied when patient is off stretcher/[place] to call system/Physically safe environment - no spills, clutter or unnecessary equipment/Purposeful proactive rounding/Room/bathroom lighting operational, light cord in reach

## 2023-10-12 NOTE — ED PROVIDER NOTE - ATTENDING APP SHARED VISIT CONTRIBUTION OF CARE
elderly female presenting w/ acute on chronic back pain and L hip pain  concern for avascular necrosis of L hip    patient not ambulatory w/ cane  pain control  will admit for pain control & PT

## 2023-10-12 NOTE — ED ADULT NURSE NOTE - DOES PATIENT HAVE ADVANCE DIRECTIVE
Occupational Therapy            [x][place] [address]      [facility]     Outpatient Pediatric Rehab Dept      Outpatient Pediatric Rehab Dept     [address], [address], [address] [facility],[address]       [address]     [phone]     Fax [phone]        Fax: [phone]    [][place] [address]          [address]            [place], [address]           [phone] Fax [phone]     PEDIATRIC THERAPY DAILY FLOWSHEET  [] Occupational Therapy []Physical Therapy [] Speech and Language Pathology    Name: Alysha Gardner   : 2013  MR#: [id number]   Date of Eval: 2021   Referring Diagnosis: R29.898 Hand weakness. Referring Physician: Justin Kimball Treatment Diagnosis:  R29.898 Hand weakness. Handwriting Difficulties F81.81    POC Due Date: 10/13/2021    Attended: 3   Cancels:    No Shows:   Objective Findings:  Date 8/3/21       Time in/out [id number]       Total Tx Min. 45       Timed Tx Min. 45       Charges 3       Pain (0-10) 0       Subjective/  Adverse Reaction to tx Alysha Gardner, was evaluated through a synchronous (real-time) audio-video encounter. The patient (or guardian if applicable) is aware that this is a billable service. Verbal consent to proceed has been obtained within the past 12 months. The visit was conducted pursuant to the emergency declaration under the [address] [place] [address], [address] [place] [address] authority and the [employer] and [employer] Act. Patient identification was verified, and a caregiver was present when appropriate. The patient was located in a state where the provider was credentialed to provide care.     Total time spent for this encounter: 45 minute    --Lakia Bazan OT on 8/3/2021 at 2:42 PM    An electronic signature was used to authenticate this note. GOALS        1. Alphonse will use an appropriate tripod grasp or an interdigital tripod grasp during handwriting/coloring activities, with min cues/A in 3/4 trials.     --       2. Alphonse will write all  and [facility] letters on a variety of writing paper with 100% letter memory while demonstrating fair letter size, letter to line orientation, sequence, start and writing control with minimal cues/ A. --       3. In a 3 month time period, Alphonse will increase her bilateral  strength by 2.5# and her bilateral pinch strength by 1.5#.  --       4. Alphonse's caregiver will demonstrate understanding of the [address] and will be able to follow recommendations with 75% success. Reviewed concepts of the [address] with mom during todays virtual session. Also instructed mom on how to present new, unpreferred foods to Alphonse doing so in very small portions and trying to present foods in its solo form rather than foods mixed with others such as just a hamburger carlos not a hamburger with the bun and condiments and cheese all together. 5. Alphonse will increase her interactions with food from a distal to proximal approach with minimal signs of anxiety. Alphonse ate preferred burger venice fries and nuggets in its entirety. With the cheeseburger she was able to smell, touch and place the meat part of it on her lips. She ate an entire piece of lettuce with minimal anxiety stating she wanted \"to eat like a bunny\"         6. Over a 90 day period Alphonse will accept two new foods into her diet.                  Progress related to goals:  Goal:  1 -[]  Met [] Progress Noted [] Not Met [] Defer Goals [] Continue  2 -[]  Met [] Progress Noted [] Not Met [] Defer Goals [] Continue  3 -[]  Met [] Progress Noted [] Not Met [] Defer Goals [] Continue  4 -[]  Met [] Progress Noted [] Not Met [] Defer Goals [] Continue  5 -[]  Met [] Progress Noted [] Not Met [] Defer Goals [] Continue  6 -[]  Met [] Progress Noted [] Not Met [] Defer Goals [] Continue      Adjustments to plan of care:    Patients Report of Tolerance:    Communication with other providers:    Equipment provided to patient:        Insurance:     Changes in medical status or medications:     PLAN:       Mary Cheng S/OT, 7/26/2021  Electronically Signed by Umu aMchado OT,  8/3/2021 No

## 2023-10-12 NOTE — ED ADULT NURSE REASSESSMENT NOTE - NS ED NURSE REASSESS COMMENT FT1
Pt A&Ox4. Pt getting out of bed and agitated at this time. Pt wants to go home because "medication your giving is not working". Pt has unsteady gait with cane. Pt educated on risks of leaving and benefits of staying at the hospital at this time. Pt understands and states she will stay in stretcher at this time. Pt IV was pulled out as per pt.  Pt IV US guided by Dr. Smith. Pt admitted to unit. Pt aware and agrees to stay in hospital at this time.
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
## 2023-10-12 NOTE — ED PROVIDER NOTE - CARE PLAN
1 Principal Discharge DX:	Pain of left hip  Secondary Diagnosis:	Avascular necrosis of femoral head

## 2023-10-12 NOTE — ED PROVIDER NOTE - EXTREMITY EXAM
Full ROM of the RLE and bilateral UE w/ full strength against resistance, sensation intact./left lower extremity findings

## 2023-10-12 NOTE — ED PROVIDER NOTE - MUSCULOSKELETAL MINIMAL EXAM
+ bilateral lumbar paraspinal muscle ttp. w/ midline lumbar spinal ttp., otherwise Spine appears normal, no joint tenderness. No obvious deformity/skin bruising/crepitus.
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
## 2023-10-13 VITALS
TEMPERATURE: 99 F | OXYGEN SATURATION: 93 % | HEART RATE: 78 BPM | HEIGHT: 60 IN | RESPIRATION RATE: 18 BRPM | DIASTOLIC BLOOD PRESSURE: 76 MMHG | SYSTOLIC BLOOD PRESSURE: 139 MMHG | WEIGHT: 116.4 LBS

## 2023-10-13 DIAGNOSIS — M25.552 PAIN IN LEFT HIP: ICD-10-CM

## 2023-10-13 DIAGNOSIS — M54.9 DORSALGIA, UNSPECIFIED: ICD-10-CM

## 2023-10-13 DIAGNOSIS — I10 ESSENTIAL (PRIMARY) HYPERTENSION: ICD-10-CM

## 2023-10-13 PROBLEM — J44.9 CHRONIC OBSTRUCTIVE PULMONARY DISEASE, UNSPECIFIED: Chronic | Status: ACTIVE | Noted: 2023-05-16

## 2023-10-13 PROBLEM — E11.9 TYPE 2 DIABETES MELLITUS WITHOUT COMPLICATIONS: Chronic | Status: ACTIVE | Noted: 2023-05-16

## 2023-10-13 PROBLEM — E78.5 HYPERLIPIDEMIA, UNSPECIFIED: Chronic | Status: ACTIVE | Noted: 2023-05-16

## 2023-10-13 LAB — GLUCOSE BLDC GLUCOMTR-MCNC: 122 MG/DL — HIGH (ref 70–99)

## 2023-10-13 PROCEDURE — 99239 HOSP IP/OBS DSCHRG MGMT >30: CPT

## 2023-10-13 RX ORDER — HYDROMORPHONE HYDROCHLORIDE 2 MG/ML
2 INJECTION INTRAMUSCULAR; INTRAVENOUS; SUBCUTANEOUS ONCE
Refills: 0 | Status: DISCONTINUED | OUTPATIENT
Start: 2023-10-13 | End: 2023-10-13

## 2023-10-13 RX ORDER — CYCLOBENZAPRINE HYDROCHLORIDE 10 MG/1
10 TABLET, FILM COATED ORAL THREE TIMES A DAY
Refills: 0 | Status: DISCONTINUED | OUTPATIENT
Start: 2023-10-13 | End: 2023-10-13

## 2023-10-13 RX ORDER — LEVOTHYROXINE SODIUM 125 MCG
25 TABLET ORAL DAILY
Refills: 0 | Status: DISCONTINUED | OUTPATIENT
Start: 2023-10-13 | End: 2023-10-13

## 2023-10-13 RX ORDER — SENNA PLUS 8.6 MG/1
2 TABLET ORAL AT BEDTIME
Refills: 0 | Status: DISCONTINUED | OUTPATIENT
Start: 2023-10-13 | End: 2023-10-13

## 2023-10-13 RX ORDER — LANOLIN ALCOHOL/MO/W.PET/CERES
3 CREAM (GRAM) TOPICAL AT BEDTIME
Refills: 0 | Status: DISCONTINUED | OUTPATIENT
Start: 2023-10-13 | End: 2023-10-13

## 2023-10-13 RX ORDER — KETOROLAC TROMETHAMINE 30 MG/ML
30 SYRINGE (ML) INJECTION ONCE
Refills: 0 | Status: DISCONTINUED | OUTPATIENT
Start: 2023-10-13 | End: 2023-10-13

## 2023-10-13 RX ORDER — METOPROLOL TARTRATE 50 MG
25 TABLET ORAL DAILY
Refills: 0 | Status: DISCONTINUED | OUTPATIENT
Start: 2023-10-13 | End: 2023-10-13

## 2023-10-13 RX ORDER — OXYCODONE HYDROCHLORIDE 5 MG/1
20 TABLET ORAL EVERY 4 HOURS
Refills: 0 | Status: DISCONTINUED | OUTPATIENT
Start: 2023-10-13 | End: 2023-10-13

## 2023-10-13 RX ORDER — LOSARTAN POTASSIUM 100 MG/1
25 TABLET, FILM COATED ORAL DAILY
Refills: 0 | Status: DISCONTINUED | OUTPATIENT
Start: 2023-10-13 | End: 2023-10-13

## 2023-10-13 RX ORDER — POLYETHYLENE GLYCOL 3350 17 G/17G
17 POWDER, FOR SOLUTION ORAL DAILY
Refills: 0 | Status: DISCONTINUED | OUTPATIENT
Start: 2023-10-13 | End: 2023-10-13

## 2023-10-13 RX ORDER — KETOROLAC TROMETHAMINE 30 MG/ML
30 SYRINGE (ML) INJECTION EVERY 6 HOURS
Refills: 0 | Status: DISCONTINUED | OUTPATIENT
Start: 2023-10-13 | End: 2023-10-13

## 2023-10-13 RX ORDER — OXYCODONE HYDROCHLORIDE 5 MG/1
15 TABLET ORAL EVERY 4 HOURS
Refills: 0 | Status: DISCONTINUED | OUTPATIENT
Start: 2023-10-13 | End: 2023-10-13

## 2023-10-13 RX ORDER — PANTOPRAZOLE SODIUM 20 MG/1
40 TABLET, DELAYED RELEASE ORAL
Refills: 0 | Status: DISCONTINUED | OUTPATIENT
Start: 2023-10-13 | End: 2023-10-13

## 2023-10-13 RX ORDER — OXYCODONE HYDROCHLORIDE 5 MG/1
1 TABLET ORAL
Qty: 0 | Refills: 0 | DISCHARGE
Start: 2023-10-13

## 2023-10-13 RX ORDER — MORPHINE SULFATE 50 MG/1
2 CAPSULE, EXTENDED RELEASE ORAL EVERY 4 HOURS
Refills: 0 | Status: DISCONTINUED | OUTPATIENT
Start: 2023-10-13 | End: 2023-10-13

## 2023-10-13 RX ORDER — MONTELUKAST 4 MG/1
10 TABLET, CHEWABLE ORAL DAILY
Refills: 0 | Status: DISCONTINUED | OUTPATIENT
Start: 2023-10-13 | End: 2023-10-13

## 2023-10-13 RX ORDER — ONDANSETRON 8 MG/1
4 TABLET, FILM COATED ORAL EVERY 8 HOURS
Refills: 0 | Status: DISCONTINUED | OUTPATIENT
Start: 2023-10-13 | End: 2023-10-13

## 2023-10-13 RX ORDER — ALPRAZOLAM 0.25 MG
1 TABLET ORAL
Refills: 0 | Status: DISCONTINUED | OUTPATIENT
Start: 2023-10-13 | End: 2023-10-13

## 2023-10-13 RX ORDER — ACETAMINOPHEN 500 MG
650 TABLET ORAL EVERY 6 HOURS
Refills: 0 | Status: DISCONTINUED | OUTPATIENT
Start: 2023-10-13 | End: 2023-10-13

## 2023-10-13 RX ADMIN — HYDROMORPHONE HYDROCHLORIDE 1 MILLIGRAM(S): 2 INJECTION INTRAMUSCULAR; INTRAVENOUS; SUBCUTANEOUS at 00:08

## 2023-10-13 RX ADMIN — ONDANSETRON 4 MILLIGRAM(S): 8 TABLET, FILM COATED ORAL at 07:19

## 2023-10-13 RX ADMIN — Medication 30 MILLIGRAM(S): at 11:12

## 2023-10-13 RX ADMIN — Medication 30 MILLIGRAM(S): at 08:00

## 2023-10-13 RX ADMIN — Medication 25 MICROGRAM(S): at 10:24

## 2023-10-13 RX ADMIN — Medication 30 MILLIGRAM(S): at 10:23

## 2023-10-13 RX ADMIN — Medication 30 MILLIGRAM(S): at 07:18

## 2023-10-13 RX ADMIN — LOSARTAN POTASSIUM 25 MILLIGRAM(S): 100 TABLET, FILM COATED ORAL at 10:28

## 2023-10-13 RX ADMIN — HYDROMORPHONE HYDROCHLORIDE 2 MILLIGRAM(S): 2 INJECTION INTRAMUSCULAR; INTRAVENOUS; SUBCUTANEOUS at 04:35

## 2023-10-13 NOTE — DISCHARGE NOTE PROVIDER - NSDCMRMEDTOKEN_GEN_ALL_CORE_FT
ALPRAZolam 1 mg oral tablet: 1 tab(s) orally 2 times a day  aspirin 81 mg oral tablet: 1 tab(s) orally once a day  cyclobenzaprine 10 mg oral tablet: 1 tab(s) orally 2 times a day  hydroCHLOROthiazide 12.5 mg oral tablet: 1 tab(s) orally once a day  levothyroxine 25 mcg (0.025 mg) oral tablet: 0.5 tab(s) orally once a day  loratadine 10 mg oral tablet: 1 tab(s) orally once a day  losartan 25 mg oral tablet: 1 tab(s) orally once a day  metoprolol succinate 25 mg oral tablet, extended release: 0.5 tab(s) orally once a day  montelukast 10 mg oral tablet: 1 tab(s) orally once a day  oxyCODONE 15 mg oral tablet: 1 tab(s) orally every 4 hours As needed Moderate Pain (4 - 6)  oxycodone-acetaminophen 10 mg-325 mg oral tablet: 1 tab(s) orally every 8 hours as needed for  severe pain  risperiDONE 2 mg oral tablet, disintegratin tab(s) orally once a day  rosuvastatin 20 mg oral tablet: 1 tab(s) orally once a day  Vitamin D3 125 mcg (5000 intl units) oral capsule: 1 tab(s) orally once a day

## 2023-10-13 NOTE — DISCHARGE NOTE PROVIDER - NSDCCPCAREPLAN_GEN_ALL_CORE_FT
PRINCIPAL DISCHARGE DIAGNOSIS  Diagnosis: Pain of left hip  Assessment and Plan of Treatment: pain management.      SECONDARY DISCHARGE DIAGNOSES  Diagnosis: Avascular necrosis of femoral head  Assessment and Plan of Treatment: LEFT AMA

## 2023-10-13 NOTE — H&P ADULT - NSHPLABSRESULTS_GEN_ALL_CORE
12.8   5.34  )-----------( 243      ( 12 Oct 2023 20:30 )             38.3     10-12    141  |  109<H>  |  12  ----------------------------<  136<H>  3.5   |  26  |  0.95    Ca    8.6      12 Oct 2023 20:30    TPro  6.5  /  Alb  3.1<L>  /  TBili  0.3  /  DBili  x   /  AST  25  /  ALT  19  /  AlkPhos  84  10-12      Urinalysis Basic - ( 12 Oct 2023 20:30 )    Color: x / Appearance: x / SG: x / pH: x  Gluc: 136 mg/dL / Ketone: x  / Bili: x / Urobili: x   Blood: x / Protein: x / Nitrite: x   Leuk Esterase: x / RBC: x / WBC x   Sq Epi: x / Non Sq Epi: x / Bacteria: x      CT-Lumbar spine:    No acute lumbar spine fracture, subluxation or evidence of traumatic malalignment.  Mild spondylotic changes with advanced degenerative disc disease L5-S1.

## 2023-10-13 NOTE — PATIENT PROFILE ADULT - FALL HARM RISK - HARM RISK INTERVENTIONS
There are no Wet Read(s) to document.
Assistance with ambulation/Assistance OOB with selected safe patient handling equipment/Communicate Risk of Fall with Harm to all staff/Discuss with provider need for PT consult/Monitor gait and stability/Provide patient with walking aids - walker, cane, crutches/Reinforce activity limits and safety measures with patient and family/Tailored Fall Risk Interventions/Use of alarms - bed, chair and/or voice tab/Visual Cue: Yellow wristband and red socks/Bed in lowest position, wheels locked, appropriate side rails in place/Call bell, personal items and telephone in reach/Instruct patient to call for assistance before getting out of bed or chair/Non-slip footwear when patient is out of bed/[place] to call system/Physically safe environment - no spills, clutter or unnecessary equipment/Purposeful Proactive Rounding/Room/bathroom lighting operational, light cord in reach
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
## 2023-10-13 NOTE — PATIENT PROFILE ADULT - NSTOBACCONEVERSMOKERY/N_GEN_A
FOLLOW UP WITH YOUR DENTIST.    MEDICAL COMPLIANCE WITH PLAN DISCUSSED AND RISKS OF NON-COMPLIANCE REVIEWED.  PATIENT EDUCATION COMPLETED ON DISEASE PROCESS, ETIOLOGY & PROGNOSIS.  PATIENT EXPRESSES UNDERSTANDING OF THE PLAN.  PROPER USAGE AND SIDE EFFECTS OF MEDICATIONS REVIEWED & DISCUSSED.    
Yes
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
## 2023-10-13 NOTE — PATIENT PROFILE ADULT - DIETITIAN.
Ventricular Rate : 60   Atrial Rate : 60   P-R Interval : 250   QRS Duration : 96   Q-T Interval : 462   QTC Calculation(Bezet) : 462   P Axis : 98   R Axis : -12   T Axis : -57   Diagnosis : Atrial-paced rhythm with prolonged AV conduction~T wave abnormality, consider inferior ischemia~T wave abnormality, consider anterolateral ischemia~Prolonged QT interval or tu fusion, consider myocardial disease, electrolyte imbalance, or drug effects~Abnormal ECG~When compared with ECG of 11-JUL-2017 16:07,~Electronic atrial pacemaker has replaced Electronic ventricular pacemaker~Confirmed by JOSEPHINE BAIRD ([id number]) on 8/24/2017 6:37:33 PM      dietitian/nutrition services

## 2023-10-13 NOTE — CHART NOTE - NSCHARTNOTEFT_GEN_A_CORE
Hospitalist Medicine NP    Patient is a 67y old Female admitted for left hip pain, avascular necrosis of the femoral neck . Patient is requesting to leave hospital AMA. Pt explained risks of leaving AMA including worsening of symptoms and risk of death. Pt verbalized understanding but refused to sign the AMA document.   T(C): 37.1 (10-13-23 @ 05:46), Max: 37.1 (10-13-23 @ 05:46)  HR: 78 (10-13-23 @ 05:46) (67 - 85)  BP: 139/76 (10-13-23 @ 05:46) (139/76 - 163/85)  RR: 18 (10-13-23 @ 05:46) (17 - 18)  SpO2: 93% (10-13-23 @ 05:46) (93% - 96%)    - Dr. Andrade notified and aware.    Tanja Mosley NP- C.
PDI	Current Rx	Drug Type	Rx Written	Rx Dispensed	Drug	Quantity	Days Supply	Prescriber Name	Prescriber FLYNN #  A	N	O	01/18/2023	01/21/2023	oxycodone-acetaminophen  mg tab	90	30	Elvin, Eleanor DAY MD	[id number]  Payment Method Insurance  Dispenser [facility]  A	N	O	12/16/2022	12/19/2022	oxycodone-acetaminophen  mg tab	90	30	Elvin, Eleanor DAY MD	[id number]  Payment Method Insurance  Dispenser [facility]  A	N	O	11/16/2022	11/21/2022	oxycodone-acetaminophen  mg tab	90	30	Elvin, Eleanor DAY MD	[id number]  Payment Method Insurance  Dispens[facility]  A	N	O	10/20/2022	10/21/2022	oxycodone-acetaminophen  mg tab	90	30	Elvin, Eleanor DAY MD	[id number]  Payment Method Insurance  Disp[facility]  B	Y	B	10/02/2023	10/02/2023	alprazolam 1 mg tablet	60	30	[id number]  Payment Method Insurance  Dispenser [facility]  B	N	B	08/21/2023	08/21/2023	alprazolam 1 mg tablet	60	30	[id number]  Payment Method Insurance  Dispenser [facility]  B	N	B	07/24/2023	07/24/2023	alprazolam 1 mg tablet	60	30	[id number]  Payment Method Insurance  Dispenser [facility]  B	N	B	06/26/2023	06/26/2023	alprazolam 1 mg tablet	60	30	[id number]  Payment Method Insurance  Dispenser [facility]  B	N	B	04/17/2023	04/17/2023	alprazolam 1 mg tablet	60	30	[id number]  Payment Method Insurance  Dispenser [facility]  B	N	O	03/16/2023	03/21/2023	oxycodone-acetaminophen  mg tab	90	30	Elvin, Eleanor DAY MD	[id number]  Payment Method Insurance  Dispenser [facility]  B	N	B	03/17/2023	03/21/2023	alprazolam 0.5 mg tablet	60	30	Deb Hilario [id number]  Payment Method Insurance  Dispenser [facility]  B	N	O	02/17/2023	02/20/2023	oxycodone-acetaminophen  mg tab	90	30	Elvin, Eleanor DAY MD	[id number]  Payment Method Insurance  Dispenser [facility]  B	N	B	02/17/2023	02/20/2023	alprazolam 0.25 mg tablet	60	30	Deb Hilario [id number]  Payment Method Insurance  Dispenser [facility]  C	Y	O	10/06/2023	10/10/2023	oxycodone-acetaminophen  mg tab	90	30	Elvin, Eleanor DAY MD	[id number]  Payment Method Insurance  Dispenser [facility]  C	N	O	09/07/2023	09/07/2023	oxycodone-acetaminophen  mg tab	90	30	Elvin, Eleanor DAY MD	[id number]  Payment Method Insurance  Dispenser [facility]  C	N	O	08/03/2023	08/04/2023	oxycodone-acetaminophen  mg tab	90	30	Elvin, Eleanor DAY MD	[id number]  Payment Method Insurance  Dispenser [facility]  C	N	O	06/30/2023	07/03/2023	oxycodone-acetaminophen  mg tab	90	30	Elvin, Eleanor DAY MD	[id number]  Payment Method Insurance  Dispenser [facility]  C	N	O	04/21/2023	04/21/2023	oxycodone-acetaminophen  mg tab	90	30	Elvin, Eleanor DAY MD	[id number]  Payment Method Insurance  Dispenser [facility]  D	N	O	06/01/2023	06/05/2023	oxycodone-acetaminophen  mg tab	90	30	Elvin, Eleanor DAY MD	[id number]  Payment Method Insurance  Dispens[facility]
Patient examined at bedside this am. Frustrated as she is on chronic pain meds and feels her pain has not been managed. Patient ISTOpped confirmed [id number]/acetaminophen 325mg   - Given acute on chronic pain - will change pain meds to oxycodone 20mg /15mg for severe and moderate with morphine 2mg for breakthrough. Standing tylenol for 3 days   - PT consult   - Patient reports she is not getting all her psych meds. When asked what her pharmacy is patient states forget it. Can follow up in AM, when pain is better controlled patient may be more amenable to disclose pharmacy and meds. Per chart review in 5/2023 - was on risperidone but psych in house, unclear if true bipolar disorder. Defer starting for now  - Rest of plan per HP
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
## 2023-10-13 NOTE — H&P ADULT - NSHPPHYSICALEXAM_GEN_ALL_CORE
GENERAL: NAD, well-groomed, well-developed, NAD   HEAD:  Atraumatic, Normocephalic  EYES: EOMI, PERRL, conjunctiva and sclera clear  ENMT: No tonsillar erythema, exudates, or enlargement; Moist mucous membranes, Good dentition, No lesions  NECK: Supple, No JVD, Normal thyroid  CHEST/LUNG: Clear bilaterally; No rales, rhonchi, wheezing, or rubs  HEART: Regular rhythm. tachycardic.  ABDOMEN: Soft,  Bowel sounds present, Non tender   EXTREMITIES:  2+ Peripheral Pulses, No clubbing, cyanosis, or edema. LLE - limited ROM due to pain.   LYMPH: No lymphadenopathy noted  SKIN: No rashes or lesion

## 2023-10-18 DIAGNOSIS — Z88.5 ALLERGY STATUS TO NARCOTIC AGENT: ICD-10-CM

## 2023-10-18 DIAGNOSIS — I10 ESSENTIAL (PRIMARY) HYPERTENSION: ICD-10-CM

## 2023-10-18 DIAGNOSIS — F31.9 BIPOLAR DISORDER, UNSPECIFIED: ICD-10-CM

## 2023-10-18 DIAGNOSIS — Z88.0 ALLERGY STATUS TO PENICILLIN: ICD-10-CM

## 2023-10-18 DIAGNOSIS — E03.9 HYPOTHYROIDISM, UNSPECIFIED: ICD-10-CM

## 2023-10-18 DIAGNOSIS — M25.552 PAIN IN LEFT HIP: ICD-10-CM

## 2023-10-18 DIAGNOSIS — M87.852 OTHER OSTEONECROSIS, LEFT FEMUR: ICD-10-CM

## 2023-10-18 DIAGNOSIS — I25.10 ATHEROSCLEROTIC HEART DISEASE OF NATIVE CORONARY ARTERY WITHOUT ANGINA PECTORIS: ICD-10-CM

## 2023-10-18 DIAGNOSIS — J44.9 CHRONIC OBSTRUCTIVE PULMONARY DISEASE, UNSPECIFIED: ICD-10-CM

## 2024-04-13 NOTE — H&P ADULT - PROBLEM/PLAN-4
Transition of Care    RUR: 20%  High Risk  Readmission? No  1st IM letter given? Yes - 4/10/24  1st  letter given: No        Transition of Care Plan:    Prior Level of Functioning: Indpendent  Disposition: Home with Home Health; [facility]  IDME needed: No  Transportation at discharge: Autoobile; Family VS Wheelchair van  IM/IMM Medicare/ letter given: 4/10/2024  Is patient a  and connected with [facility]? No   If yes, was [place] transfer form completed and [facility] notified?   Caregiver Contact: Corazon Perez - Daughter  [phone]  Discharge Caregiver contacted prior to discharge? No  Care Conference needed: No   Barriers to discharge:  Medical Stability     DISPLAY PLAN FREE TEXT
